# Patient Record
Sex: FEMALE | Race: WHITE | NOT HISPANIC OR LATINO
[De-identification: names, ages, dates, MRNs, and addresses within clinical notes are randomized per-mention and may not be internally consistent; named-entity substitution may affect disease eponyms.]

---

## 2017-01-11 ENCOUNTER — APPOINTMENT (OUTPATIENT)
Dept: MAMMOGRAPHY | Facility: CLINIC | Age: 82
End: 2017-01-11

## 2017-01-11 ENCOUNTER — OUTPATIENT (OUTPATIENT)
Dept: OUTPATIENT SERVICES | Facility: HOSPITAL | Age: 82
LOS: 1 days | End: 2017-01-11
Payer: MEDICARE

## 2017-01-11 ENCOUNTER — APPOINTMENT (OUTPATIENT)
Dept: ULTRASOUND IMAGING | Facility: CLINIC | Age: 82
End: 2017-01-11

## 2017-01-11 DIAGNOSIS — Z00.00 ENCOUNTER FOR GENERAL ADULT MEDICAL EXAMINATION WITHOUT ABNORMAL FINDINGS: ICD-10-CM

## 2017-01-11 DIAGNOSIS — D17.9 BENIGN LIPOMATOUS NEOPLASM, UNSPECIFIED: ICD-10-CM

## 2017-01-11 PROCEDURE — 77067 SCR MAMMO BI INCL CAD: CPT

## 2017-01-11 PROCEDURE — 76536 US EXAM OF HEAD AND NECK: CPT

## 2017-01-11 PROCEDURE — 77063 BREAST TOMOSYNTHESIS BI: CPT

## 2017-01-13 ENCOUNTER — MESSAGE (OUTPATIENT)
Age: 82
End: 2017-01-13

## 2017-01-17 DIAGNOSIS — Z12.31 ENCOUNTER FOR SCREENING MAMMOGRAM FOR MALIGNANT NEOPLASM OF BREAST: ICD-10-CM

## 2017-01-17 DIAGNOSIS — R22.0 LOCALIZED SWELLING, MASS AND LUMP, HEAD: ICD-10-CM

## 2017-01-20 ENCOUNTER — RX RENEWAL (OUTPATIENT)
Age: 82
End: 2017-01-20

## 2017-01-22 ENCOUNTER — EMERGENCY (EMERGENCY)
Facility: HOSPITAL | Age: 82
LOS: 1 days | Discharge: HOME | End: 2017-01-22
Attending: EMERGENCY MEDICINE
Payer: MEDICARE

## 2017-01-22 VITALS — WEIGHT: 128.09 LBS

## 2017-01-22 VITALS — SYSTOLIC BLOOD PRESSURE: 186 MMHG | HEART RATE: 80 BPM | DIASTOLIC BLOOD PRESSURE: 86 MMHG

## 2017-01-22 LAB
ALBUMIN SERPL ELPH-MCNC: 4.4 G/DL — SIGNIFICANT CHANGE UP (ref 3.3–5.2)
ALP SERPL-CCNC: 81 U/L — SIGNIFICANT CHANGE UP (ref 40–120)
ALT FLD-CCNC: 14 U/L — SIGNIFICANT CHANGE UP
ANION GAP SERPL CALC-SCNC: 16 MMOL/L — SIGNIFICANT CHANGE UP (ref 5–17)
APPEARANCE UR: CLEAR — SIGNIFICANT CHANGE UP
AST SERPL-CCNC: 19 U/L — SIGNIFICANT CHANGE UP
BACTERIA # UR AUTO: ABNORMAL
BASOPHILS # BLD AUTO: 0 K/UL — SIGNIFICANT CHANGE UP (ref 0–0.2)
BASOPHILS NFR BLD AUTO: 0.5 % — SIGNIFICANT CHANGE UP (ref 0–2)
BILIRUB SERPL-MCNC: 1.1 MG/DL — SIGNIFICANT CHANGE UP (ref 0.4–2)
BILIRUB UR-MCNC: NEGATIVE — SIGNIFICANT CHANGE UP
BUN SERPL-MCNC: 19 MG/DL — SIGNIFICANT CHANGE UP (ref 8–20)
CALCIUM SERPL-MCNC: 10.2 MG/DL — SIGNIFICANT CHANGE UP (ref 8.6–10.2)
CHLORIDE SERPL-SCNC: 97 MMOL/L — LOW (ref 98–107)
CO2 SERPL-SCNC: 27 MMOL/L — SIGNIFICANT CHANGE UP (ref 22–29)
COLOR SPEC: YELLOW — SIGNIFICANT CHANGE UP
CREAT SERPL-MCNC: 0.61 MG/DL — SIGNIFICANT CHANGE UP (ref 0.5–1.3)
DIFF PNL FLD: ABNORMAL
EOSINOPHIL # BLD AUTO: 0.2 K/UL — SIGNIFICANT CHANGE UP (ref 0–0.5)
EOSINOPHIL NFR BLD AUTO: 2.7 % — SIGNIFICANT CHANGE UP (ref 0–6)
EPI CELLS # UR: SIGNIFICANT CHANGE UP
GLUCOSE SERPL-MCNC: 119 MG/DL — HIGH (ref 70–115)
GLUCOSE UR QL: NEGATIVE MG/DL — SIGNIFICANT CHANGE UP
HCT VFR BLD CALC: 41.1 % — SIGNIFICANT CHANGE UP (ref 37–47)
HGB BLD-MCNC: 13.6 G/DL — SIGNIFICANT CHANGE UP (ref 12–16)
KETONES UR-MCNC: NEGATIVE — SIGNIFICANT CHANGE UP
LEUKOCYTE ESTERASE UR-ACNC: ABNORMAL
LYMPHOCYTES # BLD AUTO: 2 K/UL — SIGNIFICANT CHANGE UP (ref 1–4.8)
LYMPHOCYTES # BLD AUTO: 31.9 % — SIGNIFICANT CHANGE UP (ref 20–55)
MCHC RBC-ENTMCNC: 32.3 PG — HIGH (ref 27–31)
MCHC RBC-ENTMCNC: 33.1 G/DL — SIGNIFICANT CHANGE UP (ref 32–36)
MCV RBC AUTO: 97.6 FL — SIGNIFICANT CHANGE UP (ref 81–99)
MONOCYTES # BLD AUTO: 0.7 K/UL — SIGNIFICANT CHANGE UP (ref 0–0.8)
MONOCYTES NFR BLD AUTO: 11 % — HIGH (ref 3–10)
NEUTROPHILS # BLD AUTO: 3.3 K/UL — SIGNIFICANT CHANGE UP (ref 1.8–8)
NEUTROPHILS NFR BLD AUTO: 53.4 % — SIGNIFICANT CHANGE UP (ref 37–73)
NITRITE UR-MCNC: NEGATIVE — SIGNIFICANT CHANGE UP
PH UR: 7 — SIGNIFICANT CHANGE UP (ref 4.8–8)
PLATELET # BLD AUTO: 207 K/UL — SIGNIFICANT CHANGE UP (ref 150–400)
POTASSIUM SERPL-MCNC: 3.6 MMOL/L — SIGNIFICANT CHANGE UP (ref 3.5–5.3)
POTASSIUM SERPL-SCNC: 3.6 MMOL/L — SIGNIFICANT CHANGE UP (ref 3.5–5.3)
PROT SERPL-MCNC: 7.5 G/DL — SIGNIFICANT CHANGE UP (ref 6.6–8.7)
PROT UR-MCNC: NEGATIVE MG/DL — SIGNIFICANT CHANGE UP
RBC # BLD: 4.21 M/UL — LOW (ref 4.4–5.2)
RBC # FLD: 13.2 % — SIGNIFICANT CHANGE UP (ref 11–15.6)
RBC CASTS # UR COMP ASSIST: ABNORMAL /HPF (ref 0–4)
SODIUM SERPL-SCNC: 140 MMOL/L — SIGNIFICANT CHANGE UP (ref 135–145)
SP GR SPEC: 1.01 — SIGNIFICANT CHANGE UP (ref 1.01–1.02)
UROBILINOGEN FLD QL: NEGATIVE MG/DL — SIGNIFICANT CHANGE UP
WBC # BLD: 6.2 K/UL — SIGNIFICANT CHANGE UP (ref 4.8–10.8)
WBC # FLD AUTO: 6.2 K/UL — SIGNIFICANT CHANGE UP (ref 4.8–10.8)
WBC UR QL: ABNORMAL

## 2017-01-22 PROCEDURE — 74177 CT ABD & PELVIS W/CONTRAST: CPT

## 2017-01-22 PROCEDURE — 74177 CT ABD & PELVIS W/CONTRAST: CPT | Mod: 26

## 2017-01-22 PROCEDURE — 76856 US EXAM PELVIC COMPLETE: CPT | Mod: 26

## 2017-01-22 PROCEDURE — 87086 URINE CULTURE/COLONY COUNT: CPT

## 2017-01-22 PROCEDURE — 76856 US EXAM PELVIC COMPLETE: CPT

## 2017-01-22 PROCEDURE — 81001 URINALYSIS AUTO W/SCOPE: CPT

## 2017-01-22 PROCEDURE — 85027 COMPLETE CBC AUTOMATED: CPT

## 2017-01-22 PROCEDURE — 80053 COMPREHEN METABOLIC PANEL: CPT

## 2017-01-22 PROCEDURE — 99284 EMERGENCY DEPT VISIT MOD MDM: CPT

## 2017-01-22 PROCEDURE — 99284 EMERGENCY DEPT VISIT MOD MDM: CPT | Mod: 25

## 2017-01-22 RX ORDER — METHENAMINE MANDELATE 1 G
1 TABLET ORAL
Qty: 20 | Refills: 0 | OUTPATIENT
Start: 2017-01-22 | End: 2017-02-01

## 2017-01-22 RX ORDER — SODIUM CHLORIDE 9 MG/ML
1000 INJECTION INTRAMUSCULAR; INTRAVENOUS; SUBCUTANEOUS ONCE
Qty: 0 | Refills: 0 | Status: COMPLETED | OUTPATIENT
Start: 2017-01-22 | End: 2017-01-22

## 2017-01-22 RX ADMIN — SODIUM CHLORIDE 1000 MILLILITER(S): 9 INJECTION INTRAMUSCULAR; INTRAVENOUS; SUBCUTANEOUS at 13:53

## 2017-01-22 NOTE — ED ADULT NURSE REASSESSMENT NOTE - NS ED NURSE REASSESS COMMENT FT1
PT axox3  with new onset of shaking after drinking PO contrast. Pt denies any allergies and states she felt a fluttering in her stomach, pt does have slight tremors and is anxious. NIKKO Ospina made aware, v/s done ( please refer to flow sheet). Tremors now subsiding,  pt thinks it may have been because she did not eat anything today with a combination of nerves.

## 2017-01-22 NOTE — ED STATDOCS - MEDICAL DECISION MAKING DETAILS
82 y/o F pt w/ vaginal bleeding. Will get pelvic exam, US and possible further testing to evaluate for vaginal bleeding.

## 2017-01-22 NOTE — ED STATDOCS - NS ED MD SCRIBE ATTENDING SCRIBE SECTIONS
PHYSICAL EXAM/VITAL SIGNS( Pullset)/REVIEW OF SYSTEMS/DISPOSITION/PAST MEDICAL/SURGICAL/SOCIAL HISTORY/HISTORY OF PRESENT ILLNESS

## 2017-01-22 NOTE — ED STATDOCS - OBJECTIVE STATEMENT
82 y/o F pt w/ PMHx of HTN and HLD presents to the ED c/o vaginal bleeding (passing small clots) onset today morning. Pt also notes headache today. Pt states that she was taking a shower when had continuous vaginal bleeding; pt had episode of vaginal bleeding a few years ago and noted a "blockage" of the vaginal that was not painful. Pt has not had a pap smear in more than 5 years. Pt takes a baby ASA today morning. Pt denies abd pain, fever, chills, other blood thinner use, N/V/D or any other complaints. Pt has multiple drug allergies as stated in records. PMD: Dr. White. 82 y/o F pt w/ PMHx of HTN and HLD presents to the ED c/o vaginal bleeding (passing small clots) onset today morning. Pt also notes headache today. Pt states that she was taking a shower when she had continuous vaginal bleeding; pt had episode of vaginal bleeding a few years ago and noted a "blockage" of the vagina that was not painful. Pt has not had a pap smear in more than 5 years. Pt takes a baby ASA daily. No blood thinners.Pt denies abd pain, fever, chills, other blood thinner use, N/V/D or any other complaints. Pt has multiple drug allergies as stated in records. PMD: Dr. White.

## 2017-01-22 NOTE — ED ADULT NURSE NOTE - OBJECTIVE STATEMENT
Pt axox3 c/o vaginal bleeding which started today in the shower. Pt states  she does not have a hx of vag. bleeding and was also expelling clots with a moderate flow of blood. Now patients states it has stopped but does feel a little light  headed. Pt ambulatory in ER, daughter at bedside. Pt denies any recent bleeding, cramping or pain.

## 2017-01-22 NOTE — ED STATDOCS - PROGRESS NOTE DETAILS
Pt seen and evaluated. 80 yo F pmh HTN, HLD on baby ASA  presented to ED with c/o vaginal bleeding today cleaning shower. Pt reports some dizziness. No CP, SOB or palp. Pt last saw GYN approx 5 years ago. Pt denies any easy bruising or bleeding. No pain. Pelvc exam + prolapse with varicosities. no active bleeding. Will check labs, US and CT CT noted- incidental kidney stone 3mm- pt denies pain. UA + blood. Urine culture sent. PE varicosities in prolapse - ?vaginal bleed vs hematuria. Instructed to f/u GYN and .

## 2017-01-22 NOTE — ED STATDOCS - CARE PLAN
Principal Discharge DX:	Vaginal bleeding  Secondary Diagnosis:	Kidney stone Principal Discharge DX:	Vaginal bleeding  Secondary Diagnosis:	Kidney stone  Secondary Diagnosis:	UTI (urinary tract infection)

## 2017-01-23 LAB
CULTURE RESULTS: SIGNIFICANT CHANGE UP
SPECIMEN SOURCE: SIGNIFICANT CHANGE UP

## 2017-01-23 NOTE — ED POST DISCHARGE NOTE - OTHER COMMUNICATION
Spoke with daughter, pt is doing well with no current complaints, has pending f.u with pcp this week, will repeat UA then.

## 2017-01-26 DIAGNOSIS — N39.0 URINARY TRACT INFECTION, SITE NOT SPECIFIED: ICD-10-CM

## 2017-01-26 DIAGNOSIS — Z88.8 ALLERGY STATUS TO OTHER DRUGS, MEDICAMENTS AND BIOLOGICAL SUBSTANCES STATUS: ICD-10-CM

## 2017-01-26 DIAGNOSIS — I10 ESSENTIAL (PRIMARY) HYPERTENSION: ICD-10-CM

## 2017-01-26 DIAGNOSIS — Z88.0 ALLERGY STATUS TO PENICILLIN: ICD-10-CM

## 2017-01-26 DIAGNOSIS — Z88.1 ALLERGY STATUS TO OTHER ANTIBIOTIC AGENTS STATUS: ICD-10-CM

## 2017-01-26 DIAGNOSIS — N39.9 DISORDER OF URINARY SYSTEM, UNSPECIFIED: ICD-10-CM

## 2017-01-26 DIAGNOSIS — N20.0 CALCULUS OF KIDNEY: ICD-10-CM

## 2017-01-26 DIAGNOSIS — E78.5 HYPERLIPIDEMIA, UNSPECIFIED: ICD-10-CM

## 2017-01-26 DIAGNOSIS — R51 HEADACHE: ICD-10-CM

## 2017-03-22 ENCOUNTER — RX RENEWAL (OUTPATIENT)
Age: 82
End: 2017-03-22

## 2017-06-12 ENCOUNTER — APPOINTMENT (OUTPATIENT)
Dept: INTERNAL MEDICINE | Facility: CLINIC | Age: 82
End: 2017-06-12

## 2017-06-12 ENCOUNTER — RX RENEWAL (OUTPATIENT)
Age: 82
End: 2017-06-12

## 2017-06-12 VITALS
SYSTOLIC BLOOD PRESSURE: 120 MMHG | HEIGHT: 61 IN | BODY MASS INDEX: 24.55 KG/M2 | WEIGHT: 130 LBS | DIASTOLIC BLOOD PRESSURE: 66 MMHG

## 2017-06-12 PROBLEM — I10 ESSENTIAL (PRIMARY) HYPERTENSION: Chronic | Status: ACTIVE | Noted: 2017-01-22

## 2017-06-12 PROBLEM — E78.5 HYPERLIPIDEMIA, UNSPECIFIED: Chronic | Status: ACTIVE | Noted: 2017-01-22

## 2017-06-14 LAB
25(OH)D3 SERPL-MCNC: 46.5 NG/ML
ANION GAP SERPL CALC-SCNC: 16 MMOL/L
BUN SERPL-MCNC: 22 MG/DL
CALCIUM SERPL-MCNC: 9.6 MG/DL
CHLORIDE SERPL-SCNC: 99 MMOL/L
CHOLEST SERPL-MCNC: 207 MG/DL
CHOLEST/HDLC SERPL: 3.1 RATIO
CO2 SERPL-SCNC: 29 MMOL/L
CREAT SERPL-MCNC: 0.82 MG/DL
GLUCOSE SERPL-MCNC: 118 MG/DL
HBA1C MFR BLD HPLC: 5.9 %
HDLC SERPL-MCNC: 66 MG/DL
LDLC SERPL CALC-MCNC: 119 MG/DL
POTASSIUM SERPL-SCNC: 4.5 MMOL/L
SODIUM SERPL-SCNC: 144 MMOL/L
TRIGL SERPL-MCNC: 108 MG/DL

## 2017-06-16 ENCOUNTER — RESULT REVIEW (OUTPATIENT)
Age: 82
End: 2017-06-16

## 2017-06-29 ENCOUNTER — RX RENEWAL (OUTPATIENT)
Age: 82
End: 2017-06-29

## 2017-09-22 ENCOUNTER — RX RENEWAL (OUTPATIENT)
Age: 82
End: 2017-09-22

## 2017-10-26 ENCOUNTER — RX RENEWAL (OUTPATIENT)
Age: 82
End: 2017-10-26

## 2017-12-04 ENCOUNTER — APPOINTMENT (OUTPATIENT)
Dept: RADIOLOGY | Facility: CLINIC | Age: 82
End: 2017-12-04

## 2017-12-04 ENCOUNTER — APPOINTMENT (OUTPATIENT)
Dept: INTERNAL MEDICINE | Facility: CLINIC | Age: 82
End: 2017-12-04
Payer: MEDICARE

## 2017-12-04 ENCOUNTER — OUTPATIENT (OUTPATIENT)
Dept: OUTPATIENT SERVICES | Facility: HOSPITAL | Age: 82
LOS: 1 days | End: 2017-12-04
Payer: MEDICARE

## 2017-12-04 VITALS
OXYGEN SATURATION: 97 % | DIASTOLIC BLOOD PRESSURE: 68 MMHG | HEIGHT: 61 IN | WEIGHT: 130 LBS | HEART RATE: 82 BPM | SYSTOLIC BLOOD PRESSURE: 130 MMHG | BODY MASS INDEX: 24.55 KG/M2

## 2017-12-04 DIAGNOSIS — M25.562 PAIN IN LEFT KNEE: ICD-10-CM

## 2017-12-04 PROCEDURE — 36415 COLL VENOUS BLD VENIPUNCTURE: CPT

## 2017-12-04 PROCEDURE — 73502 X-RAY EXAM HIP UNI 2-3 VIEWS: CPT | Mod: 26,LT

## 2017-12-04 PROCEDURE — 73562 X-RAY EXAM OF KNEE 3: CPT

## 2017-12-04 PROCEDURE — 73562 X-RAY EXAM OF KNEE 3: CPT | Mod: 26,LT

## 2017-12-04 PROCEDURE — 99214 OFFICE O/P EST MOD 30 MIN: CPT | Mod: 25

## 2017-12-04 PROCEDURE — 73502 X-RAY EXAM HIP UNI 2-3 VIEWS: CPT

## 2017-12-06 LAB
ANION GAP SERPL CALC-SCNC: 17 MMOL/L
BUN SERPL-MCNC: 28 MG/DL
CALCIUM SERPL-MCNC: 10.6 MG/DL
CHLORIDE SERPL-SCNC: 102 MMOL/L
CHOLEST SERPL-MCNC: 247 MG/DL
CHOLEST/HDLC SERPL: 3.5 RATIO
CO2 SERPL-SCNC: 27 MMOL/L
CREAT SERPL-MCNC: 0.98 MG/DL
GLUCOSE SERPL-MCNC: 124 MG/DL
HBA1C MFR BLD HPLC: 5.9 %
HDLC SERPL-MCNC: 71 MG/DL
LDLC SERPL CALC-MCNC: 150 MG/DL
POTASSIUM SERPL-SCNC: 4.1 MMOL/L
SODIUM SERPL-SCNC: 146 MMOL/L
TRIGL SERPL-MCNC: 132 MG/DL

## 2017-12-18 ENCOUNTER — MEDICATION RENEWAL (OUTPATIENT)
Age: 82
End: 2017-12-18

## 2018-02-07 ENCOUNTER — OUTPATIENT (OUTPATIENT)
Dept: OUTPATIENT SERVICES | Facility: HOSPITAL | Age: 83
LOS: 1 days | End: 2018-02-07
Payer: MEDICARE

## 2018-02-07 ENCOUNTER — APPOINTMENT (OUTPATIENT)
Dept: MAMMOGRAPHY | Facility: CLINIC | Age: 83
End: 2018-02-07

## 2018-02-07 DIAGNOSIS — Z00.00 ENCOUNTER FOR GENERAL ADULT MEDICAL EXAMINATION WITHOUT ABNORMAL FINDINGS: ICD-10-CM

## 2018-02-07 PROCEDURE — 77063 BREAST TOMOSYNTHESIS BI: CPT | Mod: 26

## 2018-02-07 PROCEDURE — 77067 SCR MAMMO BI INCL CAD: CPT

## 2018-02-07 PROCEDURE — 77063 BREAST TOMOSYNTHESIS BI: CPT

## 2018-02-07 PROCEDURE — 77067 SCR MAMMO BI INCL CAD: CPT | Mod: 26

## 2018-02-28 ENCOUNTER — APPOINTMENT (OUTPATIENT)
Dept: VASCULAR SURGERY | Facility: CLINIC | Age: 83
End: 2018-02-28
Payer: MEDICARE

## 2018-02-28 VITALS
DIASTOLIC BLOOD PRESSURE: 79 MMHG | OXYGEN SATURATION: 95 % | SYSTOLIC BLOOD PRESSURE: 179 MMHG | TEMPERATURE: 98.5 F | HEART RATE: 80 BPM | RESPIRATION RATE: 16 BRPM | WEIGHT: 132 LBS | HEIGHT: 61 IN | BODY MASS INDEX: 24.92 KG/M2

## 2018-02-28 DIAGNOSIS — Z84.89 FAMILY HISTORY OF OTHER SPECIFIED CONDITIONS: ICD-10-CM

## 2018-02-28 DIAGNOSIS — Z82.49 FAMILY HISTORY OF ISCHEMIC HEART DISEASE AND OTHER DISEASES OF THE CIRCULATORY SYSTEM: ICD-10-CM

## 2018-02-28 PROCEDURE — 93970 EXTREMITY STUDY: CPT

## 2018-02-28 PROCEDURE — 99203 OFFICE O/P NEW LOW 30 MIN: CPT

## 2018-03-19 ENCOUNTER — MEDICATION RENEWAL (OUTPATIENT)
Age: 83
End: 2018-03-19

## 2018-03-28 PROBLEM — Z82.49 FAMILY HISTORY OF VARICOSE VEINS: Status: ACTIVE | Noted: 2018-02-28

## 2018-03-28 PROBLEM — Z84.89 FAMILY HISTORY OF PHLEBITIS: Status: ACTIVE | Noted: 2018-02-28

## 2018-03-29 ENCOUNTER — MEDICATION RENEWAL (OUTPATIENT)
Age: 83
End: 2018-03-29

## 2018-04-23 ENCOUNTER — RX RENEWAL (OUTPATIENT)
Age: 83
End: 2018-04-23

## 2018-06-04 ENCOUNTER — NON-APPOINTMENT (OUTPATIENT)
Age: 83
End: 2018-06-04

## 2018-06-04 ENCOUNTER — APPOINTMENT (OUTPATIENT)
Dept: INTERNAL MEDICINE | Facility: CLINIC | Age: 83
End: 2018-06-04
Payer: MEDICARE

## 2018-06-04 VITALS
OXYGEN SATURATION: 96 % | SYSTOLIC BLOOD PRESSURE: 138 MMHG | DIASTOLIC BLOOD PRESSURE: 68 MMHG | WEIGHT: 132 LBS | HEART RATE: 74 BPM | HEIGHT: 61 IN | BODY MASS INDEX: 24.92 KG/M2

## 2018-06-04 DIAGNOSIS — I83.93 ASYMPTOMATIC VARICOSE VEINS OF BILATERAL LOWER EXTREMITIES: ICD-10-CM

## 2018-06-04 PROCEDURE — G0403: CPT

## 2018-06-04 PROCEDURE — G0402 INITIAL PREVENTIVE EXAM: CPT | Mod: 25

## 2018-06-04 PROCEDURE — 36415 COLL VENOUS BLD VENIPUNCTURE: CPT

## 2018-06-04 NOTE — ASSESSMENT
[FreeTextEntry1] : Labs will be sent out and further recommendations will be made based upon results. \par Blood pressure well controlled on current regimen. \par Last mammo 2/18.\par She is up to date on vaccines.\par She will follow up in 6 months.

## 2018-06-04 NOTE — HISTORY OF PRESENT ILLNESS
[de-identified] : Patient presents for CPE. History is significant for hypertension, hyperlipidemia, osteopenia, prediabetes. She is on amlodipine, benazepril and HCTZ for hypertension, pravastatin for hyperlipidemia and vitamin D for osteopenia. She is controlling blood sugar dietarily. She feels well and ha no acute complaints. She recently saw vascular surgery after home visit from insurance company showed severe PAD on ALEX/PVR, which was not seen when repeated. She had had phlebitis that resolved, which was likely the cause of PAD on initial testing.

## 2018-06-04 NOTE — PHYSICAL EXAM

## 2018-06-04 NOTE — HEALTH RISK ASSESSMENT
[] : No [0] : 2) Feeling down, depressed, or hopeless: Not at all (0) [[id number]] : 0 [With Significant Other] : lives with significant other [] :  [Fully functional (bathing, dressing, toileting, transferring, walking, feeding)] : Fully functional (bathing, dressing, toileting, transferring, walking, feeding) [Fully functional (using the telephone, shopping, preparing meals, housekeeping, doing laundry, using] : Fully functional and needs no help or supervision to perform IADLs (using the telephone, shopping, preparing meals, housekeeping, doing laundry, using transportation, managing medications and managing finances) [Discussed at today's visit] : Advance Directives Discussed at today's visit [Designated Healthcare Proxy] : Designated healthcare proxy [Name: ___] : Health Care Proxy's Name: [unfilled]  [Relationship: ___] : Relationship: [unfilled]

## 2018-06-06 LAB
ALBUMIN SERPL ELPH-MCNC: 4.5 G/DL
ALP BLD-CCNC: 86 U/L
ALT SERPL-CCNC: 16 U/L
ANION GAP SERPL CALC-SCNC: 13 MMOL/L
AST SERPL-CCNC: 21 U/L
BASOPHILS # BLD AUTO: 0.03 K/UL
BASOPHILS NFR BLD AUTO: 0.6 %
BILIRUB SERPL-MCNC: 1.1 MG/DL
BUN SERPL-MCNC: 21 MG/DL
CALCIUM SERPL-MCNC: 10 MG/DL
CHLORIDE SERPL-SCNC: 98 MMOL/L
CHOLEST SERPL-MCNC: 243 MG/DL
CHOLEST/HDLC SERPL: 3.1 RATIO
CO2 SERPL-SCNC: 29 MMOL/L
CREAT SERPL-MCNC: 0.89 MG/DL
EOSINOPHIL # BLD AUTO: 0.12 K/UL
EOSINOPHIL NFR BLD AUTO: 2.3 %
GLUCOSE SERPL-MCNC: 124 MG/DL
HBA1C MFR BLD HPLC: 5.9 %
HCT VFR BLD CALC: 42.7 %
HDLC SERPL-MCNC: 79 MG/DL
HGB BLD-MCNC: 13.4 G/DL
IMM GRANULOCYTES NFR BLD AUTO: 0.4 %
LDLC SERPL CALC-MCNC: 138 MG/DL
LYMPHOCYTES # BLD AUTO: 1.59 K/UL
LYMPHOCYTES NFR BLD AUTO: 30 %
MAN DIFF?: NORMAL
MCHC RBC-ENTMCNC: 31.1 PG
MCHC RBC-ENTMCNC: 31.4 GM/DL
MCV RBC AUTO: 99.1 FL
MONOCYTES # BLD AUTO: 0.43 K/UL
MONOCYTES NFR BLD AUTO: 8.1 %
NEUTROPHILS # BLD AUTO: 3.11 K/UL
NEUTROPHILS NFR BLD AUTO: 58.6 %
PLATELET # BLD AUTO: 200 K/UL
POTASSIUM SERPL-SCNC: 4 MMOL/L
PROT SERPL-MCNC: 7 G/DL
RBC # BLD: 4.31 M/UL
RBC # FLD: 13.5 %
SODIUM SERPL-SCNC: 140 MMOL/L
TRIGL SERPL-MCNC: 130 MG/DL
TSH SERPL-ACNC: 2.89 UIU/ML
WBC # FLD AUTO: 5.3 K/UL

## 2018-09-11 ENCOUNTER — APPOINTMENT (OUTPATIENT)
Dept: ORTHOPEDIC SURGERY | Facility: CLINIC | Age: 83
End: 2018-09-11
Payer: MEDICARE

## 2018-09-11 VITALS
HEIGHT: 61 IN | WEIGHT: 132 LBS | DIASTOLIC BLOOD PRESSURE: 66 MMHG | SYSTOLIC BLOOD PRESSURE: 103 MMHG | HEART RATE: 94 BPM | BODY MASS INDEX: 24.92 KG/M2

## 2018-09-11 PROCEDURE — 72100 X-RAY EXAM L-S SPINE 2/3 VWS: CPT

## 2018-09-11 PROCEDURE — 99204 OFFICE O/P NEW MOD 45 MIN: CPT

## 2018-09-19 ENCOUNTER — APPOINTMENT (OUTPATIENT)
Dept: ORTHOPEDIC SURGERY | Facility: CLINIC | Age: 83
End: 2018-09-19

## 2018-10-05 ENCOUNTER — APPOINTMENT (OUTPATIENT)
Dept: ORTHOPEDIC SURGERY | Facility: CLINIC | Age: 83
End: 2018-10-05

## 2018-10-17 ENCOUNTER — APPOINTMENT (OUTPATIENT)
Dept: INTERNAL MEDICINE | Facility: CLINIC | Age: 83
End: 2018-10-17
Payer: MEDICARE

## 2018-10-17 VITALS
OXYGEN SATURATION: 94 % | SYSTOLIC BLOOD PRESSURE: 170 MMHG | HEIGHT: 61 IN | BODY MASS INDEX: 24.92 KG/M2 | DIASTOLIC BLOOD PRESSURE: 70 MMHG | HEART RATE: 87 BPM | WEIGHT: 132 LBS

## 2018-10-17 LAB
BILIRUB UR QL STRIP: NORMAL
CLARITY UR: CLEAR
COLLECTION METHOD: NORMAL
GLUCOSE UR-MCNC: 250
HCG UR QL: 8 EU/DL
HGB UR QL STRIP.AUTO: NORMAL
KETONES UR-MCNC: 15
LEUKOCYTE ESTERASE UR QL STRIP: NORMAL
NITRITE UR QL STRIP: POSITIVE
PH UR STRIP: 5
PROT UR STRIP-MCNC: 300
SP GR UR STRIP: 1.01

## 2018-10-17 PROCEDURE — 81003 URINALYSIS AUTO W/O SCOPE: CPT | Mod: QW

## 2018-10-17 PROCEDURE — 99214 OFFICE O/P EST MOD 30 MIN: CPT | Mod: 25

## 2018-10-17 NOTE — ASSESSMENT
[FreeTextEntry1] : Patient with UTI. Rx given for macrobid 100mg BID x 3 days. Will send out urine culture. Increase fluids. \par Repeat UA at next appointment. \par BP high, usually well controlled, likely due to discomfort.

## 2018-10-17 NOTE — REVIEW OF SYSTEMS
[Dysuria] : dysuria [Frequency] : frequency [Negative] : Respiratory [FreeTextEntry7] : suprapubic pressure [FreeTextEntry9] : spinal stenosis

## 2018-10-17 NOTE — PHYSICAL EXAM
[No Acute Distress] : no acute distress [Well-Appearing] : well-appearing [No Respiratory Distress] : no respiratory distress  [Clear to Auscultation] : lungs were clear to auscultation bilaterally [No Accessory Muscle Use] : no accessory muscle use [Normal Rate] : normal rate  [Regular Rhythm] : with a regular rhythm [Normal S1, S2] : normal S1 and S2 [No Murmur] : no murmur heard [Soft] : abdomen soft [Non Tender] : non-tender [Non-distended] : non-distended [Normal Bowel Sounds] : normal bowel sounds [No CVA Tenderness] : no CVA  tenderness [Normal Affect] : the affect was normal [Normal Insight/Judgement] : insight and judgment were intact

## 2018-10-19 LAB — BACTERIA UR CULT: NORMAL

## 2018-10-23 ENCOUNTER — APPOINTMENT (OUTPATIENT)
Dept: ORTHOPEDIC SURGERY | Facility: CLINIC | Age: 83
End: 2018-10-23
Payer: MEDICARE

## 2018-10-23 VITALS
SYSTOLIC BLOOD PRESSURE: 93 MMHG | HEIGHT: 61 IN | DIASTOLIC BLOOD PRESSURE: 53 MMHG | HEART RATE: 79 BPM | BODY MASS INDEX: 24.92 KG/M2 | WEIGHT: 132 LBS

## 2018-10-23 PROCEDURE — 99215 OFFICE O/P EST HI 40 MIN: CPT

## 2018-12-10 ENCOUNTER — RX RENEWAL (OUTPATIENT)
Age: 83
End: 2018-12-10

## 2018-12-16 ENCOUNTER — RX RENEWAL (OUTPATIENT)
Age: 83
End: 2018-12-16

## 2018-12-17 ENCOUNTER — APPOINTMENT (OUTPATIENT)
Dept: INTERNAL MEDICINE | Facility: CLINIC | Age: 83
End: 2018-12-17
Payer: MEDICARE

## 2018-12-17 VITALS
BODY MASS INDEX: 24.73 KG/M2 | OXYGEN SATURATION: 96 % | WEIGHT: 131 LBS | HEART RATE: 83 BPM | HEIGHT: 61 IN | SYSTOLIC BLOOD PRESSURE: 138 MMHG | DIASTOLIC BLOOD PRESSURE: 60 MMHG

## 2018-12-17 PROCEDURE — 36415 COLL VENOUS BLD VENIPUNCTURE: CPT

## 2018-12-17 PROCEDURE — 99214 OFFICE O/P EST MOD 30 MIN: CPT | Mod: 25

## 2018-12-17 RX ORDER — NITROFURANTOIN (MONOHYDRATE/MACROCRYSTALS) 25; 75 MG/1; MG/1
100 CAPSULE ORAL TWICE DAILY
Qty: 6 | Refills: 0 | Status: DISCONTINUED | COMMUNITY
Start: 2018-10-17 | End: 2018-12-17

## 2018-12-17 RX ORDER — SULFAMETHOXAZOLE AND TRIMETHOPRIM 800; 160 MG/1; MG/1
800-160 TABLET ORAL TWICE DAILY
Qty: 14 | Refills: 0 | Status: DISCONTINUED | COMMUNITY
Start: 2018-10-21 | End: 2018-12-17

## 2018-12-17 NOTE — HISTORY OF PRESENT ILLNESS
[de-identified] : Patient presents for follow up of hypertension, hyperlipidemia, osteopenia, prediabetes. She is on amlodipine, benazepril and HCTZ for hypertension, pravastatin for hyperlipidemia and vitamin D for osteopenia. She is controlling blood sugar dietarily. She complains of right ankle pain since yesterday, with swelling. Had been doing a lot of walking. Denies known injury. Going to PT for back pain.

## 2018-12-17 NOTE — ASSESSMENT
[FreeTextEntry1] : Further recommendations based on labs. \par Blood pressure well controlled on current regimen. \par Right ankle wrapped, additional support improved pain. Advised to wrap with ACE wrap, rest, elevated. Tylenol or naprosyn prn. \par Last mammo 2/18.\par She is up to date on vaccines.\par She will follow up in 6 months.

## 2018-12-17 NOTE — PHYSICAL EXAM
[No Acute Distress] : no acute distress [No Respiratory Distress] : no respiratory distress  [Clear to Auscultation] : lungs were clear to auscultation bilaterally [No Accessory Muscle Use] : no accessory muscle use [Normal Rate] : normal rate  [Regular Rhythm] : with a regular rhythm [Normal S1, S2] : normal S1 and S2 [No Murmur] : no murmur heard [No Edema] : there was no peripheral edema [Normal Affect] : the affect was normal [Normal Insight/Judgement] : insight and judgment were intact [de-identified] : right medial malleolus swollen, tender, not red or hot.

## 2018-12-18 LAB
25(OH)D3 SERPL-MCNC: 39.5 NG/ML
ALBUMIN SERPL ELPH-MCNC: 4.3 G/DL
ALP BLD-CCNC: 85 U/L
ALT SERPL-CCNC: 17 U/L
ANION GAP SERPL CALC-SCNC: 13 MMOL/L
AST SERPL-CCNC: 19 U/L
BILIRUB SERPL-MCNC: 0.9 MG/DL
BUN SERPL-MCNC: 21 MG/DL
CALCIUM SERPL-MCNC: 10.1 MG/DL
CHLORIDE SERPL-SCNC: 100 MMOL/L
CHOLEST SERPL-MCNC: 223 MG/DL
CHOLEST/HDLC SERPL: 3.2 RATIO
CO2 SERPL-SCNC: 30 MMOL/L
CREAT SERPL-MCNC: 0.93 MG/DL
GLUCOSE SERPL-MCNC: 118 MG/DL
HBA1C MFR BLD HPLC: 5.8 %
HDLC SERPL-MCNC: 70 MG/DL
LDLC SERPL CALC-MCNC: 129 MG/DL
POTASSIUM SERPL-SCNC: 4.4 MMOL/L
PROT SERPL-MCNC: 7 G/DL
SODIUM SERPL-SCNC: 143 MMOL/L
TRIGL SERPL-MCNC: 119 MG/DL

## 2019-02-07 ENCOUNTER — FORM ENCOUNTER (OUTPATIENT)
Age: 84
End: 2019-02-07

## 2019-02-08 ENCOUNTER — OUTPATIENT (OUTPATIENT)
Dept: OUTPATIENT SERVICES | Facility: HOSPITAL | Age: 84
LOS: 1 days | End: 2019-02-08
Payer: MEDICARE

## 2019-02-08 ENCOUNTER — APPOINTMENT (OUTPATIENT)
Dept: MAMMOGRAPHY | Facility: CLINIC | Age: 84
End: 2019-02-08
Payer: MEDICARE

## 2019-02-08 DIAGNOSIS — Z12.31 ENCOUNTER FOR SCREENING MAMMOGRAM FOR MALIGNANT NEOPLASM OF BREAST: ICD-10-CM

## 2019-02-08 PROCEDURE — 77063 BREAST TOMOSYNTHESIS BI: CPT | Mod: 26

## 2019-02-08 PROCEDURE — 77067 SCR MAMMO BI INCL CAD: CPT | Mod: 26

## 2019-02-08 PROCEDURE — 77067 SCR MAMMO BI INCL CAD: CPT

## 2019-02-08 PROCEDURE — 77063 BREAST TOMOSYNTHESIS BI: CPT

## 2019-02-12 ENCOUNTER — OTHER (OUTPATIENT)
Age: 84
End: 2019-02-12

## 2019-03-11 ENCOUNTER — RX RENEWAL (OUTPATIENT)
Age: 84
End: 2019-03-11

## 2019-04-16 ENCOUNTER — RX RENEWAL (OUTPATIENT)
Age: 84
End: 2019-04-16

## 2019-06-04 ENCOUNTER — APPOINTMENT (OUTPATIENT)
Dept: INTERNAL MEDICINE | Facility: CLINIC | Age: 84
End: 2019-06-04
Payer: MEDICARE

## 2019-06-04 VITALS
DIASTOLIC BLOOD PRESSURE: 66 MMHG | HEART RATE: 100 BPM | OXYGEN SATURATION: 97 % | HEIGHT: 61 IN | SYSTOLIC BLOOD PRESSURE: 124 MMHG | BODY MASS INDEX: 24.73 KG/M2 | RESPIRATION RATE: 15 BRPM | WEIGHT: 131 LBS

## 2019-06-04 LAB
BILIRUB UR QL STRIP: NORMAL
GLUCOSE UR-MCNC: NORMAL
HCG UR QL: 1 EU/DL
HGB UR QL STRIP.AUTO: NORMAL
KETONES UR-MCNC: NORMAL
LEUKOCYTE ESTERASE UR QL STRIP: NORMAL
NITRITE UR QL STRIP: POSITIVE
PH UR STRIP: 6
PROT UR STRIP-MCNC: NORMAL
SP GR UR STRIP: 1.03

## 2019-06-04 PROCEDURE — 99214 OFFICE O/P EST MOD 30 MIN: CPT | Mod: 25

## 2019-06-04 PROCEDURE — 81003 URINALYSIS AUTO W/O SCOPE: CPT | Mod: QW

## 2019-06-04 NOTE — ADDENDUM
[FreeTextEntry1] : I, Juliane Gomez, acted solely as a scribe for Dr. White on this date [6/4/2019].

## 2019-06-04 NOTE — END OF VISIT
[FreeTextEntry3] : All medical record entries made by the Scribe were at my, Dr. White's, direction and personally dictated by me on [6/4/2019]. I have reviewed the chart and agree that the record accurately reflects my personal performance of the history, physical exam, assessment and plan. I have also personally directed, reviewed and agreed with the chart.

## 2019-06-04 NOTE — PHYSICAL EXAM
[No Acute Distress] : no acute distress [Well Nourished] : well nourished [Well Developed] : well developed [No Respiratory Distress] : no respiratory distress  [Well-Appearing] : well-appearing [Clear to Auscultation] : lungs were clear to auscultation bilaterally [Normal Rate] : normal rate  [No Accessory Muscle Use] : no accessory muscle use [Regular Rhythm] : with a regular rhythm [No Murmur] : no murmur heard [Normal S1, S2] : normal S1 and S2 [Soft] : abdomen soft [Non-distended] : non-distended [No Masses] : no abdominal mass palpated [No HSM] : no HSM [Normal Affect] : the affect was normal [Alert and Oriented x3] : oriented to person, place, and time [Normal Insight/Judgement] : insight and judgment were intact [de-identified] : suprapubic tenderness with no guarding or rebounding

## 2019-06-04 NOTE — REVIEW OF SYSTEMS
[Chills] : chills [Abdominal Pain] : abdominal pain [Dysuria] : dysuria [Frequency] : frequency [Negative] : Heme/Lymph [FreeTextEntry7] : dry heaving

## 2019-06-04 NOTE — HISTORY OF PRESENT ILLNESS
[FreeTextEntry8] : Patient presents with dysuria for 6 days. She has associated urinary frequency, abdominal pain, chills and dry heaving. She is unsure if she had a fever. She has tried cranberry tea. She has not eaten in 4 days due to dry heaving, but she has been able to swallow pills.\par She has had UTIs in the past.

## 2019-06-04 NOTE — ASSESSMENT
[FreeTextEntry1] : UA supports urinary tract infection.\par Urine culture sent out. \par Rx given for Keflex 500mg BID for 7 days.\par Call if no improvement.\par Follow up in 2 weeks for CPE.

## 2019-06-07 LAB — BACTERIA UR CULT: ABNORMAL

## 2019-06-14 ENCOUNTER — RX RENEWAL (OUTPATIENT)
Age: 84
End: 2019-06-14

## 2019-06-17 ENCOUNTER — APPOINTMENT (OUTPATIENT)
Dept: INTERNAL MEDICINE | Facility: CLINIC | Age: 84
End: 2019-06-17
Payer: MEDICARE

## 2019-06-17 VITALS
WEIGHT: 133 LBS | OXYGEN SATURATION: 96 % | HEIGHT: 61 IN | SYSTOLIC BLOOD PRESSURE: 124 MMHG | BODY MASS INDEX: 25.11 KG/M2 | HEART RATE: 66 BPM | DIASTOLIC BLOOD PRESSURE: 64 MMHG | RESPIRATION RATE: 15 BRPM

## 2019-06-17 DIAGNOSIS — M54.2 CERVICALGIA: ICD-10-CM

## 2019-06-17 PROCEDURE — 36415 COLL VENOUS BLD VENIPUNCTURE: CPT

## 2019-06-17 PROCEDURE — G0444 DEPRESSION SCREEN ANNUAL: CPT | Mod: 59

## 2019-06-17 PROCEDURE — 99214 OFFICE O/P EST MOD 30 MIN: CPT | Mod: 25

## 2019-06-17 RX ORDER — CEPHALEXIN 500 MG/1
500 CAPSULE ORAL
Qty: 14 | Refills: 0 | Status: DISCONTINUED | COMMUNITY
Start: 2019-06-04 | End: 2019-06-17

## 2019-06-17 NOTE — END OF VISIT
[FreeTextEntry3] : All medical record entries made by the Scribe were at my, Dr. White's, direction and personally dictated by me on [6/17/2019]. I have reviewed the chart and agree that the record accurately reflects my personal performance of the history, physical exam, assessment and plan. I have also personally directed, reviewed and agreed with the chart.

## 2019-06-17 NOTE — PHYSICAL EXAM
[No Respiratory Distress] : no respiratory distress  [No Acute Distress] : no acute distress [Clear to Auscultation] : lungs were clear to auscultation bilaterally [No Accessory Muscle Use] : no accessory muscle use [Normal Rate] : normal rate  [Regular Rhythm] : with a regular rhythm [Normal S1, S2] : normal S1 and S2 [No Murmur] : no murmur heard [No Edema] : there was no peripheral edema [Normal Affect] : the affect was normal [Normal Insight/Judgement] : insight and judgment were intact [Well Nourished] : well nourished [Well Developed] : well developed [Well-Appearing] : well-appearing [Alert and Oriented x3] : oriented to person, place, and time

## 2019-06-17 NOTE — ADDENDUM
[FreeTextEntry1] : I, Juliane Gomez, acted solely as a scribe for Dr. White on this date [6/17/2019].

## 2019-06-17 NOTE — ASSESSMENT
[FreeTextEntry1] : Labs drawn in office. Further recommendations based on labs. \par Blood pressure well controlled on current regimen. \par Declined PT for neck pain, says she will have grandson work on it. \par Last mammo 2/18.\par She is up to date on vaccines.\par She will follow up in 6 months.

## 2019-06-17 NOTE — HISTORY OF PRESENT ILLNESS
[de-identified] : Patient presents for follow up of hypertension, hyperlipidemia, osteopenia, prediabetes. She is on amlodipine, benazepril and HCTZ for hypertension, pravastatin for hyperlipidemia and vitamin D for osteopenia. She is controlling blood sugar dietarily. She reports she has been eating better recently. She complains of worsening chronic neck pain, had grandson who is PT work on neck with improvement. She has tried taking Aleve, which has helped. . \par She was recently treated for a UTI and is now feeling better.

## 2019-06-26 ENCOUNTER — RESULT REVIEW (OUTPATIENT)
Age: 84
End: 2019-06-26

## 2019-06-26 LAB
25(OH)D3 SERPL-MCNC: 43 NG/ML
ALBUMIN SERPL ELPH-MCNC: 4 G/DL
ALP BLD-CCNC: 72 U/L
ALT SERPL-CCNC: 22 U/L
ANION GAP SERPL CALC-SCNC: 13 MMOL/L
AST SERPL-CCNC: 20 U/L
BASOPHILS # BLD AUTO: 0.05 K/UL
BASOPHILS NFR BLD AUTO: 1 %
BILIRUB SERPL-MCNC: 0.2 MG/DL
BUN SERPL-MCNC: 30 MG/DL
CALCIUM SERPL-MCNC: 10.4 MG/DL
CHLORIDE SERPL-SCNC: 100 MMOL/L
CHOLEST SERPL-MCNC: 213 MG/DL
CHOLEST/HDLC SERPL: 6.1 RATIO
CO2 SERPL-SCNC: 26 MMOL/L
CREAT SERPL-MCNC: 1.45 MG/DL
EOSINOPHIL # BLD AUTO: 0.18 K/UL
EOSINOPHIL NFR BLD AUTO: 3.7 %
ESTIMATED AVERAGE GLUCOSE: 134 MG/DL
GLUCOSE SERPL-MCNC: 97 MG/DL
HBA1C MFR BLD HPLC: 6.3 %
HCT VFR BLD CALC: 39.7 %
HDLC SERPL-MCNC: 35 MG/DL
HGB BLD-MCNC: 12 G/DL
IMM GRANULOCYTES NFR BLD AUTO: 1 %
LDLC SERPL CALC-MCNC: 141 MG/DL
LYMPHOCYTES # BLD AUTO: 0.91 K/UL
LYMPHOCYTES NFR BLD AUTO: 18.8 %
MAN DIFF?: NORMAL
MCHC RBC-ENTMCNC: 30.2 GM/DL
MCHC RBC-ENTMCNC: 31.3 PG
MCV RBC AUTO: 103.4 FL
MONOCYTES # BLD AUTO: 0.69 K/UL
MONOCYTES NFR BLD AUTO: 14.2 %
NEUTROPHILS # BLD AUTO: 2.97 K/UL
NEUTROPHILS NFR BLD AUTO: 61.3 %
PLATELET # BLD AUTO: 303 K/UL
POTASSIUM SERPL-SCNC: 5 MMOL/L
PROT SERPL-MCNC: 6.7 G/DL
RBC # BLD: 3.84 M/UL
RBC # FLD: 13 %
SODIUM SERPL-SCNC: 139 MMOL/L
TRIGL SERPL-MCNC: 184 MG/DL
TSH SERPL-ACNC: 4.67 UIU/ML
WBC # FLD AUTO: 4.85 K/UL

## 2019-07-05 NOTE — HISTORY OF PRESENT ILLNESS
[FreeTextEntry8] : Patient complains of urinary urgency, frequency, dysuria, suprapubic pressure for 4 days. No fever, chills, nausea, vomiting. Took cranberry, Azo with no improvement. Had recurrent UTIs several years ago, last one was 8 years ago. 
no loss of consciousness, no gait abnormality, no headache, no sensory deficits, and no weakness.

## 2019-07-28 ENCOUNTER — RX RENEWAL (OUTPATIENT)
Age: 84
End: 2019-07-28

## 2019-08-05 ENCOUNTER — APPOINTMENT (OUTPATIENT)
Dept: INTERNAL MEDICINE | Facility: CLINIC | Age: 84
End: 2019-08-05
Payer: MEDICARE

## 2019-08-05 VITALS
BODY MASS INDEX: 25.11 KG/M2 | DIASTOLIC BLOOD PRESSURE: 80 MMHG | HEIGHT: 61 IN | WEIGHT: 133 LBS | SYSTOLIC BLOOD PRESSURE: 150 MMHG

## 2019-08-05 PROCEDURE — 99213 OFFICE O/P EST LOW 20 MIN: CPT | Mod: 25

## 2019-08-05 PROCEDURE — 36415 COLL VENOUS BLD VENIPUNCTURE: CPT

## 2019-08-05 RX ORDER — SULFAMETHOXAZOLE AND TRIMETHOPRIM 800; 160 MG/1; MG/1
800-160 TABLET ORAL TWICE DAILY
Qty: 14 | Refills: 0 | Status: DISCONTINUED | COMMUNITY
Start: 2019-06-14 | End: 2019-08-05

## 2019-08-05 NOTE — HISTORY OF PRESENT ILLNESS
[de-identified] : Patient presents for follow up of hypertension, CKD. After last visit, noted to have increase in Cr. She has been taking 12.5mg of HCTZ since. Does not check her BP at home. She is also on amlodipine and benazepril for hypertension. She is on pravastatin for hyperlipidemia.  She is under a lot of stress today as her best friend just passed away. She has no acute complaints.

## 2019-08-05 NOTE — ASSESSMENT
[FreeTextEntry1] : BP slightly high but ok given age. Under a lot of stress today. Advised her to check at home. Goal <150/90. Check BMP, drawn in office. Follow up in 3 months.

## 2019-08-06 LAB
ANION GAP SERPL CALC-SCNC: 14 MMOL/L
BUN SERPL-MCNC: 17 MG/DL
CALCIUM SERPL-MCNC: 10.1 MG/DL
CHLORIDE SERPL-SCNC: 100 MMOL/L
CO2 SERPL-SCNC: 30 MMOL/L
CREAT SERPL-MCNC: 0.83 MG/DL
GLUCOSE SERPL-MCNC: 142 MG/DL
POTASSIUM SERPL-SCNC: 4.2 MMOL/L
SODIUM SERPL-SCNC: 143 MMOL/L

## 2019-08-07 ENCOUNTER — RESULT REVIEW (OUTPATIENT)
Age: 84
End: 2019-08-07

## 2019-09-06 ENCOUNTER — MEDICATION RENEWAL (OUTPATIENT)
Age: 84
End: 2019-09-06

## 2019-09-30 ENCOUNTER — RESULT REVIEW (OUTPATIENT)
Age: 84
End: 2019-09-30

## 2019-09-30 ENCOUNTER — RESULT CHARGE (OUTPATIENT)
Age: 84
End: 2019-09-30

## 2019-09-30 RX ORDER — SULFAMETHOXAZOLE AND TRIMETHOPRIM 800; 160 MG/1; MG/1
800-160 TABLET ORAL TWICE DAILY
Qty: 14 | Refills: 0 | Status: COMPLETED | COMMUNITY
Start: 2019-09-30 | End: 2019-10-07

## 2019-10-02 LAB
APPEARANCE: ABNORMAL
BACTERIA: NEGATIVE
BILIRUBIN URINE: ABNORMAL
BLOOD URINE: NEGATIVE
COLOR: ABNORMAL
GLUCOSE QUALITATIVE U: NEGATIVE
HYALINE CASTS: 9 /LPF
KETONES URINE: NORMAL
LEUKOCYTE ESTERASE URINE: ABNORMAL
MICROSCOPIC-UA: NORMAL
NITRITE URINE: NEGATIVE
PH URINE: 6
PROTEIN URINE: ABNORMAL
RED BLOOD CELLS URINE: 1 /HPF
SPECIFIC GRAVITY URINE: 1.03
SQUAMOUS EPITHELIAL CELLS: 1 /HPF
UROBILINOGEN URINE: NORMAL
WHITE BLOOD CELLS URINE: 129 /HPF

## 2019-10-03 LAB — BACTERIA UR CULT: NORMAL

## 2019-11-05 ENCOUNTER — RX RENEWAL (OUTPATIENT)
Age: 84
End: 2019-11-05

## 2019-11-18 ENCOUNTER — APPOINTMENT (OUTPATIENT)
Dept: INTERNAL MEDICINE | Facility: CLINIC | Age: 84
End: 2019-11-18

## 2019-12-16 ENCOUNTER — APPOINTMENT (OUTPATIENT)
Dept: INTERNAL MEDICINE | Facility: CLINIC | Age: 84
End: 2019-12-16

## 2020-01-21 ENCOUNTER — APPOINTMENT (OUTPATIENT)
Dept: INTERNAL MEDICINE | Facility: CLINIC | Age: 85
End: 2020-01-21
Payer: MEDICARE

## 2020-01-21 ENCOUNTER — NON-APPOINTMENT (OUTPATIENT)
Age: 85
End: 2020-01-21

## 2020-01-21 VITALS
WEIGHT: 127 LBS | BODY MASS INDEX: 23.98 KG/M2 | DIASTOLIC BLOOD PRESSURE: 60 MMHG | OXYGEN SATURATION: 96 % | HEIGHT: 61 IN | SYSTOLIC BLOOD PRESSURE: 136 MMHG | TEMPERATURE: 97.9 F | HEART RATE: 76 BPM

## 2020-01-21 DIAGNOSIS — H91.93 UNSPECIFIED HEARING LOSS, BILATERAL: ICD-10-CM

## 2020-01-21 DIAGNOSIS — Z87.440 PERSONAL HISTORY OF URINARY (TRACT) INFECTIONS: ICD-10-CM

## 2020-01-21 DIAGNOSIS — Z00.00 ENCOUNTER FOR GENERAL ADULT MEDICAL EXAMINATION W/OUT ABNORMAL FINDINGS: ICD-10-CM

## 2020-01-21 DIAGNOSIS — Z87.828 PERSONAL HISTORY OF OTHER (HEALED) PHYSICAL INJURY AND TRAUMA: ICD-10-CM

## 2020-01-21 PROCEDURE — G0439: CPT

## 2020-01-21 PROCEDURE — G0442 ANNUAL ALCOHOL SCREEN 15 MIN: CPT

## 2020-01-21 PROCEDURE — 36415 COLL VENOUS BLD VENIPUNCTURE: CPT

## 2020-01-21 NOTE — PHYSICAL EXAM
[No Acute Distress] : no acute distress [Well Nourished] : well nourished [Well Developed] : well developed [Well-Appearing] : well-appearing [PERRL] : pupils equal round and reactive to light [Normal Sclera/Conjunctiva] : normal sclera/conjunctiva [EOMI] : extraocular movements intact [Normal Outer Ear/Nose] : the outer ears and nose were normal in appearance [No JVD] : no jugular venous distention [Normal Oropharynx] : the oropharynx was normal [No Lymphadenopathy] : no lymphadenopathy [Supple] : supple [Thyroid Normal, No Nodules] : the thyroid was normal and there were no nodules present [No Respiratory Distress] : no respiratory distress  [No Accessory Muscle Use] : no accessory muscle use [Normal Rate] : normal rate  [Clear to Auscultation] : lungs were clear to auscultation bilaterally [Regular Rhythm] : with a regular rhythm [Normal S1, S2] : normal S1 and S2 [No Murmur] : no murmur heard [No Carotid Bruits] : no carotid bruits [No Abdominal Bruit] : a ~M bruit was not heard ~T in the abdomen [Pedal Pulses Present] : the pedal pulses are present [No Edema] : there was no peripheral edema [No Varicosities] : no varicosities [No Palpable Aorta] : no palpable aorta [No Extremity Clubbing/Cyanosis] : no extremity clubbing/cyanosis [Non Tender] : non-tender [Soft] : abdomen soft [Non-distended] : non-distended [No HSM] : no HSM [Normal Bowel Sounds] : normal bowel sounds [No Masses] : no abdominal mass palpated [Normal Posterior Cervical Nodes] : no posterior cervical lymphadenopathy [Normal Anterior Cervical Nodes] : no anterior cervical lymphadenopathy [No CVA Tenderness] : no CVA  tenderness [No Spinal Tenderness] : no spinal tenderness [No Joint Swelling] : no joint swelling [Grossly Normal Strength/Tone] : grossly normal strength/tone [No Rash] : no rash [No Focal Deficits] : no focal deficits [Normal Gait] : normal gait [Coordination Grossly Intact] : coordination grossly intact [Deep Tendon Reflexes (DTR)] : deep tendon reflexes were 2+ and symmetric [Normal Affect] : the affect was normal [Normal Insight/Judgement] : insight and judgment were intact

## 2020-01-21 NOTE — COUNSELING
[Adequate lighting] : Adequate lighting [Fall prevention counseling provided] : Fall prevention counseling provided [Use proper foot wear] : Use proper foot wear [Use recommended devices] : Use recommended devices [No throw rugs] : No throw rugs

## 2020-01-22 LAB
25(OH)D3 SERPL-MCNC: 52.9 NG/ML
ANION GAP SERPL CALC-SCNC: 16 MMOL/L
BASOPHILS # BLD AUTO: 0.04 K/UL
BASOPHILS NFR BLD AUTO: 0.7 %
BUN SERPL-MCNC: 22 MG/DL
CALCIUM SERPL-MCNC: 10.2 MG/DL
CHLORIDE SERPL-SCNC: 101 MMOL/L
CHOLEST SERPL-MCNC: 245 MG/DL
CHOLEST/HDLC SERPL: 3.3 RATIO
CO2 SERPL-SCNC: 27 MMOL/L
CREAT SERPL-MCNC: 0.8 MG/DL
EOSINOPHIL # BLD AUTO: 0.16 K/UL
EOSINOPHIL NFR BLD AUTO: 2.9 %
ESTIMATED AVERAGE GLUCOSE: 117 MG/DL
GLUCOSE SERPL-MCNC: 107 MG/DL
HBA1C MFR BLD HPLC: 5.7 %
HCT VFR BLD CALC: 43 %
HDLC SERPL-MCNC: 75 MG/DL
HGB BLD-MCNC: 13.4 G/DL
IMM GRANULOCYTES NFR BLD AUTO: 0.4 %
LDLC SERPL CALC-MCNC: 147 MG/DL
LYMPHOCYTES # BLD AUTO: 1.9 K/UL
LYMPHOCYTES NFR BLD AUTO: 34.9 %
MAN DIFF?: NORMAL
MCHC RBC-ENTMCNC: 30.9 PG
MCHC RBC-ENTMCNC: 31.2 GM/DL
MCV RBC AUTO: 99.3 FL
MONOCYTES # BLD AUTO: 0.55 K/UL
MONOCYTES NFR BLD AUTO: 10.1 %
NEUTROPHILS # BLD AUTO: 2.78 K/UL
NEUTROPHILS NFR BLD AUTO: 51 %
PLATELET # BLD AUTO: 196 K/UL
POTASSIUM SERPL-SCNC: 4.1 MMOL/L
RBC # BLD: 4.33 M/UL
RBC # FLD: 12.9 %
SODIUM SERPL-SCNC: 143 MMOL/L
TRIGL SERPL-MCNC: 114 MG/DL
WBC # FLD AUTO: 5.45 K/UL

## 2020-01-22 NOTE — HISTORY OF PRESENT ILLNESS
[FreeTextEntry1] : Annual wellness visit [de-identified] : -PMH: HTN, HLD, Lumbar & Cervical Spinal stenosis\par -SH: . 4 children. Lives with her  in an apartment (2nd floor). Denies any falls. Still driving. \par \par WILLIS is a 84 year F whom is here today for an annual well check and to establish care with a new PMD. \par Today, pt reports feeling well but does report changes in hearing over the past several months and is requesting referral to ENT\par She is accompanied by her  today.\par \par -DEXA: 1/2018\par -Mammogram: 2/2019\par \par -HTN: Currently on Amlodipine 5mg, Benzapril 40mg QD & HCTZ 12.5mg. Pt reports compliance with current meds. Does not follow with cardio. Reports being physically and socially active daily. Denies any SOB, CP or palpitations.

## 2020-01-22 NOTE — HEALTH RISK ASSESSMENT
[Very Good] : ~his/her~  mood as very good [Yes] : Yes [2 - 4 times a month (2 pts)] : 2-4 times a month (2 points) [1 or 2 (0 pts)] : 1 or 2 (0 points) [Never (0 pts)] : Never (0 points) [No] : In the past 12 months have you used drugs other than those required for medical reasons? No [No falls in past year] : Patient reported no falls in the past year [0] : 2) Feeling down, depressed, or hopeless: Not at all (0) [Patient reported mammogram was normal] : Patient reported mammogram was normal [Patient reported bone density results were normal] : Patient reported bone density results were normal [None] : None [With Family] : lives with family [Retired] : retired [] :  [# Of Children ___] : has [unfilled] children [Fully functional (bathing, dressing, toileting, transferring, walking, feeding)] : Fully functional (bathing, dressing, toileting, transferring, walking, feeding) [Fully functional (using the telephone, shopping, preparing meals, housekeeping, doing laundry, using] : Fully functional and needs no help or supervision to perform IADLs (using the telephone, shopping, preparing meals, housekeeping, doing laundry, using transportation, managing medications and managing finances) [Reports changes in hearing] : Reports changes in hearing [Reviewed no changes] : Reviewed no changes [Designated Healthcare Proxy] : Designated healthcare proxy [Name: ___] : Health Care Proxy's Name: [unfilled]  [Relationship: ___] : Relationship: [unfilled] [] : No [Audit-CScore] : 2 [[id number]] : 0 [Change in mental status noted] : No change in mental status noted [Reports changes in vision] : Reports no changes in vision [BoneDensityDate] : 01/18 [MammogramDate] : 02/19 [AdvancecareDate] : 01/20

## 2020-01-22 NOTE — ADDENDUM
[FreeTextEntry1] : CBC/BMP WNL\par Vitamin D WNL\par Lipid panel: Total chol 245, \par A1c: 5.7\par \par All looks good. No new recommendations at this time. Will repeat labs in 6 months

## 2020-01-22 NOTE — ASSESSMENT
[FreeTextEntry1] : EKG completed in office today demonstrates NSR with a nonspecific intraventricular conduction defect in lead V2. This EKG is unchanged dating back to 2016.\par \par -F/u labs drawn in office today\par -F/u w/ ENT\par -Followup mammogram\par -Stop use of aspirin given lack of history of CAD/CAD risk factors\par -DC HCTZ. Increase amlodipine from 5 mg to 10 mg\par -Home BP monitoring as discussed\par -RTO 3 months or sooner if needed (We'll readdress DEXA scan)

## 2020-01-23 ENCOUNTER — RESULT REVIEW (OUTPATIENT)
Age: 85
End: 2020-01-23

## 2020-02-10 ENCOUNTER — RX RENEWAL (OUTPATIENT)
Age: 85
End: 2020-02-10

## 2020-02-12 ENCOUNTER — APPOINTMENT (OUTPATIENT)
Dept: MAMMOGRAPHY | Facility: CLINIC | Age: 85
End: 2020-02-12
Payer: MEDICARE

## 2020-02-12 ENCOUNTER — OUTPATIENT (OUTPATIENT)
Dept: OUTPATIENT SERVICES | Facility: HOSPITAL | Age: 85
LOS: 1 days | End: 2020-02-12
Payer: COMMERCIAL

## 2020-02-12 DIAGNOSIS — Z12.31 ENCOUNTER FOR SCREENING MAMMOGRAM FOR MALIGNANT NEOPLASM OF BREAST: ICD-10-CM

## 2020-02-12 PROCEDURE — 77063 BREAST TOMOSYNTHESIS BI: CPT

## 2020-02-12 PROCEDURE — 77067 SCR MAMMO BI INCL CAD: CPT | Mod: 26

## 2020-02-12 PROCEDURE — 77067 SCR MAMMO BI INCL CAD: CPT

## 2020-02-12 PROCEDURE — 77063 BREAST TOMOSYNTHESIS BI: CPT | Mod: 26

## 2020-06-03 ENCOUNTER — APPOINTMENT (OUTPATIENT)
Dept: ORTHOPEDIC SURGERY | Facility: CLINIC | Age: 85
End: 2020-06-03
Payer: MEDICARE

## 2020-06-03 VITALS
HEART RATE: 73 BPM | HEIGHT: 60 IN | BODY MASS INDEX: 25.13 KG/M2 | DIASTOLIC BLOOD PRESSURE: 77 MMHG | SYSTOLIC BLOOD PRESSURE: 151 MMHG | WEIGHT: 128 LBS

## 2020-06-03 VITALS — TEMPERATURE: 98.6 F

## 2020-06-03 DIAGNOSIS — M65.351 TRIGGER FINGER, RIGHT LITTLE FINGER: ICD-10-CM

## 2020-06-03 DIAGNOSIS — M48.00 SPINAL STENOSIS, SITE UNSPECIFIED: ICD-10-CM

## 2020-06-03 PROCEDURE — 20550 NJX 1 TENDON SHEATH/LIGAMENT: CPT | Mod: F9

## 2020-06-03 PROCEDURE — 99214 OFFICE O/P EST MOD 30 MIN: CPT | Mod: 25

## 2020-06-19 ENCOUNTER — APPOINTMENT (OUTPATIENT)
Dept: INTERNAL MEDICINE | Facility: CLINIC | Age: 85
End: 2020-06-19
Payer: MEDICARE

## 2020-06-19 VITALS
DIASTOLIC BLOOD PRESSURE: 62 MMHG | WEIGHT: 128 LBS | HEART RATE: 75 BPM | BODY MASS INDEX: 25.13 KG/M2 | OXYGEN SATURATION: 96 % | RESPIRATION RATE: 14 BRPM | HEIGHT: 60 IN | SYSTOLIC BLOOD PRESSURE: 130 MMHG

## 2020-06-19 DIAGNOSIS — Z13.31 ENCOUNTER FOR SCREENING FOR DEPRESSION: ICD-10-CM

## 2020-06-19 DIAGNOSIS — Z86.39 PERSONAL HISTORY OF OTHER ENDOCRINE, NUTRITIONAL AND METABOLIC DISEASE: ICD-10-CM

## 2020-06-19 DIAGNOSIS — Z13.39 ENCOUNTER FOR SCREENING EXAM FOR OTHER MENTAL HEALTH AND BEHAVIORAL DISORDERS: ICD-10-CM

## 2020-06-19 PROCEDURE — 36415 COLL VENOUS BLD VENIPUNCTURE: CPT

## 2020-06-19 PROCEDURE — 99214 OFFICE O/P EST MOD 30 MIN: CPT | Mod: 25

## 2020-06-19 RX ORDER — LATANOPROST/PF 0.005 %
0.01 DROPS OPHTHALMIC (EYE)
Refills: 0 | Status: ACTIVE | COMMUNITY
Start: 2018-09-14

## 2020-06-22 DIAGNOSIS — D75.89 OTHER SPECIFIED DISEASES OF BLOOD AND BLOOD-FORMING ORGANS: ICD-10-CM

## 2020-06-22 LAB
25(OH)D3 SERPL-MCNC: 49 NG/ML
ANION GAP SERPL CALC-SCNC: 16 MMOL/L
BASOPHILS # BLD AUTO: 0.05 K/UL
BASOPHILS NFR BLD AUTO: 0.8 %
BUN SERPL-MCNC: 28 MG/DL
CALCIUM SERPL-MCNC: 10.3 MG/DL
CHLORIDE SERPL-SCNC: 103 MMOL/L
CHOLEST SERPL-MCNC: 243 MG/DL
CHOLEST/HDLC SERPL: 3.5 RATIO
CO2 SERPL-SCNC: 24 MMOL/L
CREAT SERPL-MCNC: 0.91 MG/DL
EOSINOPHIL # BLD AUTO: 0.18 K/UL
EOSINOPHIL NFR BLD AUTO: 3 %
ESTIMATED AVERAGE GLUCOSE: 114 MG/DL
GLUCOSE SERPL-MCNC: 103 MG/DL
HBA1C MFR BLD HPLC: 5.6 %
HCT VFR BLD CALC: 41.1 %
HDLC SERPL-MCNC: 69 MG/DL
HGB BLD-MCNC: 13.2 G/DL
IMM GRANULOCYTES NFR BLD AUTO: 0.5 %
LDLC SERPL CALC-MCNC: 150 MG/DL
LYMPHOCYTES # BLD AUTO: 1.84 K/UL
LYMPHOCYTES NFR BLD AUTO: 30.5 %
MAN DIFF?: NORMAL
MCHC RBC-ENTMCNC: 31.8 PG
MCHC RBC-ENTMCNC: 32.1 GM/DL
MCV RBC AUTO: 99 FL
MONOCYTES # BLD AUTO: 0.6 K/UL
MONOCYTES NFR BLD AUTO: 9.9 %
NEUTROPHILS # BLD AUTO: 3.34 K/UL
NEUTROPHILS NFR BLD AUTO: 55.3 %
PLATELET # BLD AUTO: 197 K/UL
POTASSIUM SERPL-SCNC: 4.9 MMOL/L
RBC # BLD: 4.15 M/UL
RBC # FLD: 12.7 %
SARS-COV-2 IGG SERPL IA-ACNC: 0.01 INDEX
SARS-COV-2 IGG SERPL QL IA: NEGATIVE
SODIUM SERPL-SCNC: 143 MMOL/L
TRIGL SERPL-MCNC: 123 MG/DL
WBC # FLD AUTO: 6.04 K/UL

## 2020-06-22 NOTE — ADDENDUM
[FreeTextEntry1] : Lipid panel: Total chol 233, \par CBC/BMP: WNL\par A1c: 5.6\par Vitamin D 49\par \par #1 Renal function stable with no abnormality\par #2 pre diabetes stable\par #3 osteopenia continue with daily vitamin D supplementation\par #4 COVID antibodies negative

## 2020-06-22 NOTE — ASSESSMENT
[FreeTextEntry1] : -PMH: HTN, HLD, CKD, Osteopenia, Pre-DM, Lumbar & Cervical Spinal stenosis\par -SH: . 4 children. Lives with her  in an apartment (2nd floor). Denies any falls. Still driving. \par \par WILLIS is a 84 year F whom is here today for f/u HTN, HLD, Osteopenia\par \par -F/u labs drawn in office today\par -Further recs pending lab results \par -Decrease Pravastatin to 40mg\par -RTO 6 mo for CPE

## 2020-07-06 ENCOUNTER — RX RENEWAL (OUTPATIENT)
Age: 85
End: 2020-07-06

## 2020-07-27 ENCOUNTER — RX RENEWAL (OUTPATIENT)
Age: 85
End: 2020-07-27

## 2020-08-19 NOTE — HISTORY OF PRESENT ILLNESS
[FreeTextEntry1] : F/u HTN [de-identified] : -PMH: HTN, HLD, CKD, Osteopenia, Pre-DM, Lumbar & Cervical Spinal stenosis\par -SH: . 4 children. Lives with her  in an apartment (2nd floor). Denies any falls. Still driving. \par \par WILLIS is a 84 year F whom is here today for f/u HTN, HLD, Osteopenia\par Today, pt reports feeling well and is w/o concerns \par No reported changes since last f/u visit\par \par -HTN: Remains on Amlodipine 10mg & Benzapril 40mg QD. Does not follow with cardio. No reported changes.\par -HLD: Remains on Pravastatin 80mg. No reported changes. \par -Osteopenia: (2018) DEXA. Remains on Vit-D3 sup. No reported changes  Total Volume (Ccs): 1

## 2020-12-14 ENCOUNTER — APPOINTMENT (OUTPATIENT)
Dept: INTERNAL MEDICINE | Facility: CLINIC | Age: 85
End: 2020-12-14
Payer: MEDICARE

## 2020-12-14 VITALS
OXYGEN SATURATION: 8 % | HEART RATE: 78 BPM | TEMPERATURE: 97.3 F | SYSTOLIC BLOOD PRESSURE: 126 MMHG | BODY MASS INDEX: 25.52 KG/M2 | RESPIRATION RATE: 14 BRPM | HEIGHT: 60 IN | WEIGHT: 130 LBS | DIASTOLIC BLOOD PRESSURE: 62 MMHG

## 2020-12-14 PROCEDURE — 99072 ADDL SUPL MATRL&STAF TM PHE: CPT

## 2020-12-14 PROCEDURE — 99214 OFFICE O/P EST MOD 30 MIN: CPT | Mod: 25

## 2020-12-14 PROCEDURE — 36415 COLL VENOUS BLD VENIPUNCTURE: CPT

## 2020-12-15 ENCOUNTER — NON-APPOINTMENT (OUTPATIENT)
Age: 85
End: 2020-12-15

## 2020-12-15 LAB
25(OH)D3 SERPL-MCNC: 52.4 NG/ML
ANION GAP SERPL CALC-SCNC: 11 MMOL/L
BUN SERPL-MCNC: 26 MG/DL
CALCIUM SERPL-MCNC: 10.1 MG/DL
CHLORIDE SERPL-SCNC: 103 MMOL/L
CHOLEST SERPL-MCNC: 236 MG/DL
CO2 SERPL-SCNC: 27 MMOL/L
CREAT SERPL-MCNC: 1.03 MG/DL
ESTIMATED AVERAGE GLUCOSE: 120 MG/DL
GLUCOSE SERPL-MCNC: 119 MG/DL
HBA1C MFR BLD HPLC: 5.8 %
HDLC SERPL-MCNC: 65 MG/DL
LDLC SERPL CALC-MCNC: 139 MG/DL
NONHDLC SERPL-MCNC: 172 MG/DL
POTASSIUM SERPL-SCNC: 4.7 MMOL/L
SODIUM SERPL-SCNC: 140 MMOL/L
TRIGL SERPL-MCNC: 162 MG/DL

## 2020-12-15 NOTE — ASSESSMENT
[FreeTextEntry1] : -PMH: HTN, HLD, CKD, Osteopenia, Pre-DM, Lumbar & Cervical Spinal stenosis\par -SH: . 4 children. Lives with her  in an apartment (2nd floor). Denies any falls. Still driving. \par \par WILLIS is a 84 year F whom is here today for f/u HTN, HLD, Osteopenia\par \par -F/u labs drawn in office today\par -Further recs pending lab results \par -Call Ins Co regarding Shingles Vaccine coverage \par -RTO 6 mo for CPE needs repeat DEXA

## 2020-12-15 NOTE — HISTORY OF PRESENT ILLNESS
[FreeTextEntry1] : F/u HTN [de-identified] : -PMH: HTN, HLD, CKD, Osteopenia, Pre-DM, Lumbar & Cervical Spinal stenosis\par -SH: . 4 children. Lives with her  in an apartment (2nd floor). Denies any falls. Still driving. \par \par WILLIS is a 84 year F whom is here today for f/u HTN, HLD\par Today, pt reports feeling well and is w/o concerns \par No reported changes since last f/u visit\par \par -HTN: Remains on Amlodipine 10mg & Benzapril 40mg QD.. No reported changes.\par -HLD: Remains on Pravastatin 40mg. No reported changes. \par -Osteopenia: (2018) DEXA. Remains on Vit-D3 sup. No reported changes

## 2020-12-15 NOTE — ADDENDUM
[FreeTextEntry1] : Cholesterol unchanged with lowered dose of pravastatin. Therefore continue with pravastatin 40 mg. Do not recommend lowering medication any further.\par \par Renal function stable and electrolytes all normal.\par \par Prediabetes is stable/unchanged there is likely an age-related component regarding this.\par \par Vitamin D WNL

## 2020-12-23 ENCOUNTER — RX RENEWAL (OUTPATIENT)
Age: 85
End: 2020-12-23

## 2021-02-12 ENCOUNTER — RX RENEWAL (OUTPATIENT)
Age: 86
End: 2021-02-12

## 2021-05-03 ENCOUNTER — APPOINTMENT (OUTPATIENT)
Dept: MAMMOGRAPHY | Facility: CLINIC | Age: 86
End: 2021-05-03
Payer: MEDICARE

## 2021-05-03 ENCOUNTER — RESULT REVIEW (OUTPATIENT)
Age: 86
End: 2021-05-03

## 2021-05-03 ENCOUNTER — OUTPATIENT (OUTPATIENT)
Dept: OUTPATIENT SERVICES | Facility: HOSPITAL | Age: 86
LOS: 1 days | End: 2021-05-03
Payer: MEDICARE

## 2021-05-03 DIAGNOSIS — Z12.31 ENCOUNTER FOR SCREENING MAMMOGRAM FOR MALIGNANT NEOPLASM OF BREAST: ICD-10-CM

## 2021-05-03 DIAGNOSIS — Z00.8 ENCOUNTER FOR OTHER GENERAL EXAMINATION: ICD-10-CM

## 2021-05-03 PROCEDURE — 77063 BREAST TOMOSYNTHESIS BI: CPT | Mod: 26

## 2021-05-03 PROCEDURE — 77063 BREAST TOMOSYNTHESIS BI: CPT

## 2021-05-03 PROCEDURE — 77067 SCR MAMMO BI INCL CAD: CPT

## 2021-05-03 PROCEDURE — 77067 SCR MAMMO BI INCL CAD: CPT | Mod: 26

## 2021-06-03 ENCOUNTER — APPOINTMENT (OUTPATIENT)
Dept: ORTHOPEDIC SURGERY | Facility: CLINIC | Age: 86
End: 2021-06-03
Payer: MEDICARE

## 2021-06-03 VITALS
SYSTOLIC BLOOD PRESSURE: 152 MMHG | HEART RATE: 82 BPM | WEIGHT: 133 LBS | DIASTOLIC BLOOD PRESSURE: 75 MMHG | HEIGHT: 60 IN | BODY MASS INDEX: 26.11 KG/M2

## 2021-06-03 DIAGNOSIS — E78.00 PURE HYPERCHOLESTEROLEMIA, UNSPECIFIED: ICD-10-CM

## 2021-06-03 DIAGNOSIS — H40.9 UNSPECIFIED GLAUCOMA: ICD-10-CM

## 2021-06-03 PROCEDURE — 99072 ADDL SUPL MATRL&STAF TM PHE: CPT

## 2021-06-03 PROCEDURE — 72100 X-RAY EXAM L-S SPINE 2/3 VWS: CPT

## 2021-06-03 PROCEDURE — 99214 OFFICE O/P EST MOD 30 MIN: CPT

## 2021-06-03 NOTE — PHYSICAL EXAM
[Poor Appearance] : well-appearing [Acute Distress] : not in acute distress [Obese] : not obese [de-identified] : CONSTITUTIONAL: The patient is a very pleasant individual who is well-nourished and who appears stated age.\par PSYCHIATRIC: The patient is alert and oriented X 3 and in no apparent distress, and participates with orthopedic evaluation well.\par HEAD: Atraumatic and is nonsyndromic in appearance.\par EENT: No visible thyromegaly, EOMI.\par RESPIRATORY: Respiratory rate is regular, not dyspneic on examination.\par LYMPHATICS: There is no inguinal lymphadenopathy\par INTEGUMENTARY: Skin is clean, dry, and intact about the bilateral lower extremities and lumbar spine.\par VASCULAR: There is brisk capillary refill about the bilateral lower extremities.\par NEUROLOGIC: There are no pathologic reflexes. There is no decrease in sensation of the bilateral lower extremities on Wartenberg pinwheel examination. Deep tendon reflexes are well maintained at 2+/4 of the bilateral lower extremities and are symmetric.\par MUSCULOSKELETAL: There is no visible muscular atrophy. Manual motor strength is well maintained in the bilateral lower extremities. Range of motion of lumbar spine is well maintained. The patient ambulates in a non-myelopathic manner. Negative tension sign and straight leg raise bilaterally. Quad extension, ankle dorsiflexion, EHL, plantar flexion, and ankle eversion are well preserved. Normal secondary orthopaedic exam of bilateral hips, greater trochanteric area, knees and ankles.  [de-identified] : X-ray of the lumbar spine taken September 2018 shows age appropriate lumbar spondylosis as well as moderate b/l hip arthritis. Mild pelvic obliquity.\par \par MRI from [facility] on 9/15/2018 shows age appropriate lumbar spondylosis. \par \par Xray of the lumbar spine taken 06/03/2021 demonstrates spondylolisthesis at L4-L5, severe multiple levels of lumbar degenerative disc disease.

## 2021-06-03 NOTE — HISTORY OF PRESENT ILLNESS
[de-identified] : 86 year old F Presents for follow up evaluation of several months of LE weakness left worse than right with an acute complaint of left groin and knee pain. She states if pain is severe if can spread to her left ankle. She states she hears cracking in her LLE often that isnt painful. chief complaint is that she can not ambulate without pain. Alleviating factors include leaning forward and sitting. She has completed PT at this time with no significant change in her pain. Patient refuses to use a cane or walker.  [Ataxia] : no ataxia [Incontinence] : no incontinence [Loss of Dexterity] : good dexterity [Urinary Ret.] : no urinary retention

## 2021-06-03 NOTE — DISCUSSION/SUMMARY
[de-identified] : Anticipatory guidance regarding LE weakness was given. I would not recommend surgical intervention at this time based on her age, however Patient has been referred to physiatry for assessment regarding their current pain level and possible injection therapy. Patient has been referred to Dr. Peace for Her hip and knee complaints. I advised light aerobic conditioning, also to begin use of a walker or cane. We discussed the maintenance that PT provides and that it may be helpful in preventing a decline. Patient to follow up on a PRN basis. \par \par Patient with multiple medical comorbidity increasing the risk of perioperative and postoperative complications as well as diminished spine outcomes as per the current medical literature.  These include but are not limited to obesity, anxiety/depression, cardiac illness, kidney disease, peripheral vascular disease, history of cancer, COPD, dysmetabolic syndrome including but not limited to diabetes, hyperlipidemia, hypertension.  Patient is being referred back to primary care provider for medical optimization, as well as other appropriate specialists as needed for optimization prior to spine surgery. \par \par Prior to appointment and during encounter with patient extensive medical records were reviewed including but not limited to, hospital records, out patient records, imaging results, and lab data. During this appointment the patient was examined, diagnoses were discussed and explained in a face to face manner. In addition extensive time was spent reviewing aforementioned diagnostic studies. Counseling including abnormal image results, differential diagnoses, treatment options, risk and benefits, lifestyle changes, current condition, and current medications was performed. Patient's comments, questions, and concerns were address and patient verbalized understanding. Based on this patient's presentation at our office, which is an orthopedic spine surgeon's office, this patient inherently / intrinsically has a risk, however minute, of developing  issues such as Cauda equina syndrome, bowel and bladder changes, or progression of motor or neurological deficits such as paralysis which may be permanent.\par \par \par

## 2021-06-03 NOTE — ADDENDUM
[FreeTextEntry1] : Documented by Lloyd Trujillo acting as a scribe for Loren Pate on 12/03/2020. All medical record entries made by the Scribe were at my, Loren Pate, direction and personally dictated by me on 12/03/2020 . I have reviewed the chart and agree that the record accurately reflects my personal performance of the history, physical exam, assessment and plan. I have also personally directed, reviewed, and agreed with the chart.

## 2021-06-08 ENCOUNTER — NON-APPOINTMENT (OUTPATIENT)
Age: 86
End: 2021-06-08

## 2021-06-08 ENCOUNTER — APPOINTMENT (OUTPATIENT)
Dept: INTERNAL MEDICINE | Facility: CLINIC | Age: 86
End: 2021-06-08
Payer: MEDICARE

## 2021-06-08 VITALS
OXYGEN SATURATION: 97 % | DIASTOLIC BLOOD PRESSURE: 68 MMHG | WEIGHT: 132 LBS | RESPIRATION RATE: 14 BRPM | HEIGHT: 60 IN | TEMPERATURE: 97.7 F | SYSTOLIC BLOOD PRESSURE: 160 MMHG | BODY MASS INDEX: 25.91 KG/M2 | HEART RATE: 81 BPM

## 2021-06-08 DIAGNOSIS — Z20.822 CONTACT WITH AND (SUSPECTED) EXPOSURE TO COVID-19: ICD-10-CM

## 2021-06-08 DIAGNOSIS — R22.1 LOCALIZED SWELLING, MASS AND LUMP, NECK: ICD-10-CM

## 2021-06-08 DIAGNOSIS — R73.03 PREDIABETES.: ICD-10-CM

## 2021-06-08 PROCEDURE — 36415 COLL VENOUS BLD VENIPUNCTURE: CPT

## 2021-06-08 PROCEDURE — 99072 ADDL SUPL MATRL&STAF TM PHE: CPT

## 2021-06-08 PROCEDURE — G0439: CPT

## 2021-06-08 PROCEDURE — 93000 ELECTROCARDIOGRAM COMPLETE: CPT

## 2021-06-08 NOTE — HISTORY OF PRESENT ILLNESS
[FreeTextEntry1] : Annual wellness visit [de-identified] : -PMH: HTN, HLD, Lumbar & Cervical Spinal stenosis\par -SH: . 4 children. Lives with her  in an apartment (2nd floor). Denies any falls. Still driving. \par \par WILLIS is a 84 year F whom is here today for an annual well check\par Today, pt reports feeling well and is w/o complaints\par She mentions that she will be going to see Pain mgt for management of her spinal stenosis\par \par Specialists: \par None\par \par Vaccines: All up t date. Declines repeat Shingles vaccine\par -DEXA: 1/2018. Scrip for repeat given today\par -Mammogram: 5/2021\par -FH of Breast., Colon or Ovarian CA: None known\par \par -HTN: Remains on Amlodipine 10mg & Benzapril 40mg QD. She is currently on Chronic Aleve. No reported changes.\par -HLD: Remains on Pravastatin 40mg. No reported changes. \par -Osteopenia: (2018) DEXA. Remains on Vit-D3 sup. No reported changes

## 2021-06-08 NOTE — HEALTH RISK ASSESSMENT
[Very Good] : ~his/her~  mood as very good [Yes] : Yes [2 - 4 times a month (2 pts)] : 2-4 times a month (2 points) [1 or 2 (0 pts)] : 1 or 2 (0 points) [Never (0 pts)] : Never (0 points) [No] : In the past 12 months have you used drugs other than those required for medical reasons? No [No falls in past year] : Patient reported no falls in the past year [0] : 2) Feeling down, depressed, or hopeless: Not at all (0) [Patient reported mammogram was normal] : Patient reported mammogram was normal [Patient reported bone density results were normal] : Patient reported bone density results were normal [None] : None [With Family] : lives with family [Retired] : retired [] :  [# Of Children ___] : has [unfilled] children [Fully functional (bathing, dressing, toileting, transferring, walking, feeding)] : Fully functional (bathing, dressing, toileting, transferring, walking, feeding) [Fully functional (using the telephone, shopping, preparing meals, housekeeping, doing laundry, using] : Fully functional and needs no help or supervision to perform IADLs (using the telephone, shopping, preparing meals, housekeeping, doing laundry, using transportation, managing medications and managing finances) [Reports changes in hearing] : Reports changes in hearing [Reviewed no changes] : Reviewed no changes [Designated Healthcare Proxy] : Designated healthcare proxy [Name: ___] : Health Care Proxy's Name: [unfilled]  [Relationship: ___] : Relationship: [unfilled] [] : No [Audit-CScore] : 2 [[facility]] : 10 [[id number]] : 0 [HIV test declined] : HIV test declined [Hepatitis C test declined] : Hepatitis C test declined [Change in mental status noted] : No change in mental status noted [Reports changes in vision] : Reports no changes in vision [MammogramDate] : 05/21 [BoneDensityDate] : 01/18 [AdvancecareDate] : 06/21

## 2021-06-08 NOTE — ASSESSMENT
[FreeTextEntry1] : -PMH: HTN, HLD, CKD, Osteopenia, Pre-DM, Lumbar & Cervical Spinal stenosis\par -SH: . 4 children. Lives with her  in an apartment (2nd floor). Denies any falls. Still driving. \par \par WILLIS is a 84 year F whom is here today for her Annual well visit\par \par Specialists:\par None\par \par EKG completed in office today demonstrates NSR with a nonspecific intraventricular conduction defect in lead V2. This EKG is unchanged dating back to 2016.\par \par -F/u labs drawn in office today\par -Further recs pending lab results \par -F/u DEXA\par -Still needs to Call [employer] regarding Shingles Vaccine coverage \par -RTO 2mo for BP check

## 2021-06-08 NOTE — PHYSICAL EXAM
[Well Nourished] : well nourished [Well Developed] : well developed [Well-Appearing] : well-appearing [Normal Sclera/Conjunctiva] : normal sclera/conjunctiva [PERRL] : pupils equal round and reactive to light [EOMI] : extraocular movements intact [Normal Outer Ear/Nose] : the outer ears and nose were normal in appearance [Normal Oropharynx] : the oropharynx was normal [No JVD] : no jugular venous distention [No Lymphadenopathy] : no lymphadenopathy [Supple] : supple [Thyroid Normal, No Nodules] : the thyroid was normal and there were no nodules present [No Respiratory Distress] : no respiratory distress  [No Accessory Muscle Use] : no accessory muscle use [Clear to Auscultation] : lungs were clear to auscultation bilaterally [Normal Rate] : normal rate  [Regular Rhythm] : with a regular rhythm [Normal S1, S2] : normal S1 and S2 [No Murmur] : no murmur heard [No Carotid Bruits] : no carotid bruits [No Abdominal Bruit] : a ~M bruit was not heard ~T in the abdomen [No Varicosities] : no varicosities [Pedal Pulses Present] : the pedal pulses are present [No Edema] : there was no peripheral edema [No Palpable Aorta] : no palpable aorta [Soft] : abdomen soft [No Extremity Clubbing/Cyanosis] : no extremity clubbing/cyanosis [Non Tender] : non-tender [Non-distended] : non-distended [No Masses] : no abdominal mass palpated [No HSM] : no HSM [Normal Bowel Sounds] : normal bowel sounds [Normal Posterior Cervical Nodes] : no posterior cervical lymphadenopathy [No CVA Tenderness] : no CVA  tenderness [Normal Anterior Cervical Nodes] : no anterior cervical lymphadenopathy [No Spinal Tenderness] : no spinal tenderness [No Joint Swelling] : no joint swelling [Grossly Normal Strength/Tone] : grossly normal strength/tone [No Rash] : no rash [Coordination Grossly Intact] : coordination grossly intact [No Focal Deficits] : no focal deficits [Deep Tendon Reflexes (DTR)] : deep tendon reflexes were 2+ and symmetric [Normal Gait] : normal gait [Normal Affect] : the affect was normal [Normal Insight/Judgement] : insight and judgment were intact

## 2021-06-09 ENCOUNTER — NON-APPOINTMENT (OUTPATIENT)
Age: 86
End: 2021-06-09

## 2021-06-09 LAB
25(OH)D3 SERPL-MCNC: 51.3 NG/ML
ALBUMIN SERPL ELPH-MCNC: 4.5 G/DL
ALP BLD-CCNC: 105 U/L
ALT SERPL-CCNC: 10 U/L
ANION GAP SERPL CALC-SCNC: 15 MMOL/L
AST SERPL-CCNC: 16 U/L
BASOPHILS # BLD AUTO: 0.04 K/UL
BASOPHILS NFR BLD AUTO: 0.7 %
BILIRUB SERPL-MCNC: 0.8 MG/DL
BUN SERPL-MCNC: 22 MG/DL
CALCIUM SERPL-MCNC: 10 MG/DL
CHLORIDE SERPL-SCNC: 104 MMOL/L
CHOLEST SERPL-MCNC: 234 MG/DL
CO2 SERPL-SCNC: 26 MMOL/L
CREAT SERPL-MCNC: 0.94 MG/DL
EOSINOPHIL # BLD AUTO: 0.16 K/UL
EOSINOPHIL NFR BLD AUTO: 2.9 %
ESTIMATED AVERAGE GLUCOSE: 120 MG/DL
GLUCOSE SERPL-MCNC: 113 MG/DL
HBA1C MFR BLD HPLC: 5.8 %
HCT VFR BLD CALC: 38.9 %
HDLC SERPL-MCNC: 65 MG/DL
HGB BLD-MCNC: 12.3 G/DL
IMM GRANULOCYTES NFR BLD AUTO: 0.4 %
LDLC SERPL CALC-MCNC: 138 MG/DL
LYMPHOCYTES # BLD AUTO: 1.6 K/UL
LYMPHOCYTES NFR BLD AUTO: 29.5 %
MAN DIFF?: NORMAL
MCHC RBC-ENTMCNC: 31.5 PG
MCHC RBC-ENTMCNC: 31.6 GM/DL
MCV RBC AUTO: 99.5 FL
MONOCYTES # BLD AUTO: 0.51 K/UL
MONOCYTES NFR BLD AUTO: 9.4 %
NEUTROPHILS # BLD AUTO: 3.1 K/UL
NEUTROPHILS NFR BLD AUTO: 57.1 %
NONHDLC SERPL-MCNC: 169 MG/DL
PLATELET # BLD AUTO: 187 K/UL
POTASSIUM SERPL-SCNC: 4.2 MMOL/L
PROT SERPL-MCNC: 6.6 G/DL
RBC # BLD: 3.91 M/UL
RBC # FLD: 13.3 %
SODIUM SERPL-SCNC: 144 MMOL/L
TRIGL SERPL-MCNC: 154 MG/DL
TSH SERPL-ACNC: 3.03 UIU/ML
WBC # FLD AUTO: 5.43 K/UL

## 2021-06-17 ENCOUNTER — APPOINTMENT (OUTPATIENT)
Dept: RADIOLOGY | Facility: CLINIC | Age: 86
End: 2021-06-17

## 2021-06-23 ENCOUNTER — APPOINTMENT (OUTPATIENT)
Age: 86
End: 2021-06-23
Payer: MEDICARE

## 2021-06-23 ENCOUNTER — OUTPATIENT (OUTPATIENT)
Dept: OUTPATIENT SERVICES | Facility: HOSPITAL | Age: 86
LOS: 1 days | End: 2021-06-23
Payer: MEDICARE

## 2021-06-23 DIAGNOSIS — M85.80 OTHER SPECIFIED DISORDERS OF BONE DENSITY AND STRUCTURE, UNSPECIFIED SITE: ICD-10-CM

## 2021-06-23 PROCEDURE — 77080 DXA BONE DENSITY AXIAL: CPT

## 2021-06-23 PROCEDURE — 77080 DXA BONE DENSITY AXIAL: CPT | Mod: 26

## 2021-06-24 ENCOUNTER — NON-APPOINTMENT (OUTPATIENT)
Age: 86
End: 2021-06-24

## 2021-06-29 NOTE — ED ADULT NURSE REASSESSMENT NOTE - CONDITION
unchanged Cyclophosphamide Counseling:  I discussed with the patient the risks of cyclophosphamide including but not limited to hair loss, hormonal abnormalities, decreased fertility, abdominal pain, diarrhea, nausea and vomiting, bone marrow suppression and infection. The patient understands that monitoring is required while taking this medication.

## 2021-07-02 ENCOUNTER — APPOINTMENT (OUTPATIENT)
Dept: PHYSICAL MEDICINE AND REHAB | Facility: CLINIC | Age: 86
End: 2021-07-02
Payer: MEDICARE

## 2021-07-02 VITALS
SYSTOLIC BLOOD PRESSURE: 171 MMHG | HEIGHT: 60 IN | TEMPERATURE: 97 F | DIASTOLIC BLOOD PRESSURE: 66 MMHG | HEART RATE: 86 BPM | WEIGHT: 133 LBS | RESPIRATION RATE: 14 BRPM | BODY MASS INDEX: 26.11 KG/M2

## 2021-07-02 DIAGNOSIS — Z78.9 OTHER SPECIFIED HEALTH STATUS: ICD-10-CM

## 2021-07-02 DIAGNOSIS — Z87.39 PERSONAL HISTORY OF OTHER DISEASES OF THE MUSCULOSKELETAL SYSTEM AND CONNECTIVE TISSUE: ICD-10-CM

## 2021-07-02 DIAGNOSIS — Z86.69 PERSONAL HISTORY OF OTHER DISEASES OF THE NERVOUS SYSTEM AND SENSE ORGANS: ICD-10-CM

## 2021-07-02 DIAGNOSIS — Z86.79 PERSONAL HISTORY OF OTHER DISEASES OF THE CIRCULATORY SYSTEM: ICD-10-CM

## 2021-07-02 PROCEDURE — 99072 ADDL SUPL MATRL&STAF TM PHE: CPT

## 2021-07-02 PROCEDURE — 99204 OFFICE O/P NEW MOD 45 MIN: CPT

## 2021-07-02 NOTE — ASSESSMENT
[FreeTextEntry1] : 86 y.o. F w/ clinical hip spine syndrome characterized by advanced lumbar DDD/spondylosis, L4-5 spondy/ stenosis, L L4/5 radiculopathy and likely left hip OA.  I spent most of today's visit (35 min) reviewing the patient's old MRI, discussing pathogenesis and further non-operative management.  Given h/o mild L4-5 stenosis on MRI 2018 and now w/ DF weakness, pt. need updated MRI study to evaluate progression of underlying stenosis.  May then consider LESI.  Also needs x-rays hip to gauge degree of DJD.  Advised caution w/ chronic use of PO NSAIDs 2' GI/cardiac/renal toxicities.  Rx P.T. for modalities, gentle ROM, stretching and strengthening exercises.  Pt. is in agreement with plan.  All questions answered.  RTC 1-2 weeks for imaging review.

## 2021-07-02 NOTE — DATA REVIEWED
[MRI] : MRI [FreeTextEntry1] : MRI L-spine (Sept. 2018): multilevel DDD worse at L5-S1 w/ near ankylosis of opposing endplates; Gr I anterolisthesis L4 on L5 -> mild central canal stenosis; mild-mod central canal stenosis L3-4 2' facet OA; L1-2 moderate central canal stenosis 2' R SIDED HNP + LIG FLAV THICKENING.

## 2021-07-02 NOTE — PHYSICAL EXAM
[FreeTextEntry1] : NAD\par A&Ox3\par Non-obese\par ROM L-spine: 1/2 full forward flexion before pain; <5' extension before pain\par ROM Hips: pain w/ IR left hip (groin); LBP w/ IR/ER right hip\par Pelvic tilt: ++\par Seated slump test: neg left\par SLR: neg left\par YULIA's: + left\par DTR's: 2+ knees; 1+ right ankle; absent left ankle\par MMT: 4/5 L TA\par Sensation: SILT\par Toe & Heel Walk: deferred\par \par

## 2021-07-02 NOTE — HISTORY OF PRESENT ILLNESS
[FreeTextEntry1] : 86 y.o. F w/ h/o HTN, CRI, glaucoma presents to office w/ c/o "spinal stenosis".  Pt. is c/o LBP radiating into left groin, knee and shin and dorsum of left foot.  Pt. states that she has been bothered by LBP for past 3 years but leg pain started about 6 months ago.  Pt. saw Dr. Posey who stated she was not candidate for surgery 2' her age.  MRI L-spine done and reviewed below.  Pt.'s last course of P.T. was about 3 years ago.  Takes 2 tabs Aleve which no longer helps.  No LESI.

## 2021-07-10 ENCOUNTER — RX RENEWAL (OUTPATIENT)
Age: 86
End: 2021-07-10

## 2021-07-16 ENCOUNTER — APPOINTMENT (OUTPATIENT)
Dept: RADIOLOGY | Facility: CLINIC | Age: 86
End: 2021-07-16
Payer: MEDICARE

## 2021-07-16 ENCOUNTER — OUTPATIENT (OUTPATIENT)
Dept: OUTPATIENT SERVICES | Facility: HOSPITAL | Age: 86
LOS: 1 days | End: 2021-07-16
Payer: MEDICARE

## 2021-07-16 ENCOUNTER — APPOINTMENT (OUTPATIENT)
Dept: MRI IMAGING | Facility: CLINIC | Age: 86
End: 2021-07-16
Payer: MEDICARE

## 2021-07-16 DIAGNOSIS — M48.062 SPINAL STENOSIS, LUMBAR REGION WITH NEUROGENIC CLAUDICATION: ICD-10-CM

## 2021-07-16 PROCEDURE — 73502 X-RAY EXAM HIP UNI 2-3 VIEWS: CPT | Mod: 26,LT

## 2021-07-16 PROCEDURE — 72148 MRI LUMBAR SPINE W/O DYE: CPT | Mod: 26

## 2021-07-16 PROCEDURE — 73502 X-RAY EXAM HIP UNI 2-3 VIEWS: CPT

## 2021-07-16 PROCEDURE — 72148 MRI LUMBAR SPINE W/O DYE: CPT

## 2021-07-27 ENCOUNTER — APPOINTMENT (OUTPATIENT)
Dept: PHYSICAL MEDICINE AND REHAB | Facility: CLINIC | Age: 86
End: 2021-07-27
Payer: MEDICARE

## 2021-07-27 VITALS
BODY MASS INDEX: 25.11 KG/M2 | WEIGHT: 133 LBS | DIASTOLIC BLOOD PRESSURE: 68 MMHG | RESPIRATION RATE: 14 BRPM | HEART RATE: 76 BPM | HEIGHT: 61 IN | SYSTOLIC BLOOD PRESSURE: 160 MMHG

## 2021-07-27 PROCEDURE — 99072 ADDL SUPL MATRL&STAF TM PHE: CPT

## 2021-07-27 PROCEDURE — 99214 OFFICE O/P EST MOD 30 MIN: CPT

## 2021-07-27 NOTE — ASSESSMENT
[FreeTextEntry1] : 86 y.o. 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  I spent most of today's visit (25 min) reviewing the patient's new MRI and x-rays, as well as discussing further non-operative management.   Given the patient's predominance of left hip and groin pain, I think it is reasonable to proceed with an US guided left hip CSI.  We may then consider the need for L L4-5 TFESI given the concordance of her shin pain, DF weakness and MRI findings.   Again, advised caution w/ chronic use of PO NSAIDs 2' GI/cardiac/renal toxicities.  Advised to c/w P.T. for modalities, gentle ROM, stretching and strengthening exercises.  Pt. is in agreement with plan.  All questions answered.  RTC 2-3 weeks for US guided LEFT HIP CSI.

## 2021-07-27 NOTE — DATA REVIEWED
[MRI] : MRI [FreeTextEntry1] : X-rays Pelvis and Hip (July 2021): No hip fractures or dislocations.  Lower lumbar spine degenerative change. Advanced bilateral hip osteoarthritis left greater than right and progressed from prior.  Intact pelvic and obturator rings and symmetrically aligned and spaced SI joints and pubic symphysis. Generalized osteopenia otherwise no discrete lytic or blastic lesions.\par \par MRI L-spine (July 2021): Grade 1 anterolisthesis at L5-S1 on a degenerative basis due to facet osteophytic hypertrophy. Multilevel degenerative disc disease and spondylosis at T11-12 through S1-S2 with loss of disc height and associated degenerative endplate changes. Disc bulges at these levels flatten the ventral thecal sac and narrow the BILATERAL neural foramina. Mild central stenosis at L2-3, L3-4 and moderate central stenosis at L4-5 and L5-S1 on a degenerative basis due to disc bulge, facet osteophytic hypertrophy and redundancy of ligamentum flavum. RIGHT lateral disc herniation noted at L1-2. Broad-based RIGHT paracentral disc herniation at L2-3 flattens the ventral thecal sac and markedly narrows the RIGHT neural foramen.  Severe L > R L4-5 NF stenosis.  \par \par MRI L-spine (Sept. 2018): multilevel DDD worse at L5-S1 w/ near ankylosis of opposing endplates; Gr I anterolisthesis L4 on L5 -> mild central canal stenosis; mild-mod central canal stenosis L3-4 2' facet OA; L1-2 moderate central canal stenosis 2' R SIDED HNP + LIG FLAV THICKENING.

## 2021-07-27 NOTE — HISTORY OF PRESENT ILLNESS
[FreeTextEntry1] : 86 y.o. F w/ h/o clinical hip spine syndrome characterized by advanced lumbar DDD/spondylosis, L4-5 spondy/ stenosis, L L4/5 radiculopathy and likely left hip OA returns to office for imaging review.  MRI L-spine and x-rays hip detailed below.

## 2021-07-27 NOTE — PHYSICAL EXAM
[FreeTextEntry1] : NAD\par A&Ox3\par Non-obese\par No significant change in today's examination\par ROM L-spine: 1/2 full forward flexion before pain; <5' extension before pain\par ROM Hips: pain w/ IR left hip (groin and lateral hip) - static; LBP w/ IR/ER right hip\par Pelvic tilt: ++\par Seated slump test: neg left\par SLR: neg left\par YULIA's: + left\par DTR's: 2+ knees; 1+ right ankle; absent left ankle\par MMT: 4/5 L TA\par Sensation: SILT\par Toe & Heel Walk: deferred\par \par

## 2021-08-01 ENCOUNTER — RX RENEWAL (OUTPATIENT)
Age: 86
End: 2021-08-01

## 2021-08-18 ENCOUNTER — APPOINTMENT (OUTPATIENT)
Dept: INTERNAL MEDICINE | Facility: CLINIC | Age: 86
End: 2021-08-18
Payer: MEDICARE

## 2021-08-18 VITALS
HEIGHT: 61 IN | OXYGEN SATURATION: 98 % | RESPIRATION RATE: 14 BRPM | TEMPERATURE: 97.7 F | SYSTOLIC BLOOD PRESSURE: 122 MMHG | WEIGHT: 130 LBS | BODY MASS INDEX: 24.55 KG/M2 | DIASTOLIC BLOOD PRESSURE: 76 MMHG | HEART RATE: 76 BPM

## 2021-08-18 DIAGNOSIS — L30.0 NUMMULAR DERMATITIS: ICD-10-CM

## 2021-08-18 PROCEDURE — 99214 OFFICE O/P EST MOD 30 MIN: CPT

## 2021-08-18 NOTE — HISTORY OF PRESENT ILLNESS
[FreeTextEntry1] : HTN, Gait Instability [de-identified] : -PMH: HTN, HLD, Lumbar & Cervical Spinal stenosis\par -SH: . 4 children. Lives with her  in an apartment (2nd floor). Denies any falls. Still driving. \par \par WILLIS is a 84 year F whom is here today for f/u uncontrolled HTN & Skin rash\par Today, pt reports feeling well and is w/o complaints\par She mentions that since our last visit, she followed up with ortho whom referred pt to Pain Mgt. She is attending PT now for 6-8 weeks. Plan to proceede with injections and consideration of Epidural injections. \par \par -HTN: Remains on Amlodipine 10mg & Benzapril 40mg QD. She is currently on Chronic Aleve. No reported changes.\par -HLD: Remains on Pravastatin 40mg. No reported changes.\par -Osteopenia: (2018) DEXA. Remains on Vit-D3 sup. No reported changes

## 2021-08-18 NOTE — ASSESSMENT
[FreeTextEntry1] : -PMH: HTN, HLD, CKD, Osteopenia, Pre-DM, Lumbar & Cervical Spinal stenosis\par -SH: . 4 children. Lives with her  in an apartment (2nd floor). Denies any falls. Still driving. \par \par WILLIS is a 84 year F whom is here today for f/u HTN\par \par Specialists:\par None\par \par -Still needs to Call [employer] regarding Shingles Vaccine coverage \par -RTO 3mo for routine f/u & labns or sooner if needed

## 2021-09-03 ENCOUNTER — APPOINTMENT (OUTPATIENT)
Dept: PHYSICAL MEDICINE AND REHAB | Facility: CLINIC | Age: 86
End: 2021-09-03
Payer: MEDICARE

## 2021-09-03 VITALS
SYSTOLIC BLOOD PRESSURE: 176 MMHG | BODY MASS INDEX: 25.11 KG/M2 | RESPIRATION RATE: 14 BRPM | HEIGHT: 61 IN | DIASTOLIC BLOOD PRESSURE: 66 MMHG | HEART RATE: 85 BPM | WEIGHT: 133 LBS

## 2021-09-03 PROCEDURE — 20611 DRAIN/INJ JOINT/BURSA W/US: CPT | Mod: LT

## 2021-10-01 ENCOUNTER — APPOINTMENT (OUTPATIENT)
Dept: PHYSICAL MEDICINE AND REHAB | Facility: CLINIC | Age: 86
End: 2021-10-01
Payer: MEDICARE

## 2021-10-01 VITALS
BODY MASS INDEX: 25.11 KG/M2 | DIASTOLIC BLOOD PRESSURE: 67 MMHG | RESPIRATION RATE: 14 BRPM | HEIGHT: 61 IN | WEIGHT: 133 LBS | HEART RATE: 78 BPM | SYSTOLIC BLOOD PRESSURE: 148 MMHG

## 2021-10-01 PROCEDURE — 99214 OFFICE O/P EST MOD 30 MIN: CPT

## 2021-10-01 NOTE — ASSESSMENT
[FreeTextEntry1] : 86 y.o. 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  I spent most of today's visit (25 min) discussing further non-operative management.   Pt. had modest response to US guided left hip CSI.  No role for PRP injection given advanced DJD.  I deferred L L4-5 TFESI at this time as the patient seems to be more symptomatic from her left hip than from a L L4/5 radic.   Again, advised caution w/ chronic use of PO NSAIDs 2' GI/cardiac/renal toxicities.  Discussed R/B/A to low dose tramadol 50 mg; 1/2 - 1 tab po bid prn as tolerated.  Risks include but not limited to GI, CNS and respiratory dysfunction.   Advised to c/w P.T. for modalities, gentle ROM, stretching and strengthening exercises.  Pt. is in agreement with plan.  All questions answered.  RTC 6 weeks.

## 2021-10-01 NOTE — HISTORY OF PRESENT ILLNESS
[FreeTextEntry1] : 86 y.o. F w/ h/o clinical hip spine syndrome characterized by advanced lumbar DDD/spondylosis, L4-5 spondy/ stenosis, L L4/5 radiculopathy and left > right hip OA returns to office for f/u s/p US guided left hip CSI (9/3/21).  Pt. reports 25% symptomatic relief post-procedure.  Pt. states that she is better able to walk but is still limited.  She is no longer reaching for the walls when walking.  Pt. states that she is not a candidate for a TKA.  She decreased her consumption of Aleve to prn.

## 2021-10-04 ENCOUNTER — RX RENEWAL (OUTPATIENT)
Age: 86
End: 2021-10-04

## 2021-11-15 ENCOUNTER — APPOINTMENT (OUTPATIENT)
Dept: PHYSICAL MEDICINE AND REHAB | Facility: CLINIC | Age: 86
End: 2021-11-15

## 2021-12-16 ENCOUNTER — APPOINTMENT (OUTPATIENT)
Dept: INTERNAL MEDICINE | Facility: CLINIC | Age: 86
End: 2021-12-16
Payer: MEDICARE

## 2021-12-16 VITALS — TEMPERATURE: 97.6 F | HEART RATE: 81 BPM | RESPIRATION RATE: 16 BRPM | OXYGEN SATURATION: 97 %

## 2021-12-16 VITALS — BODY MASS INDEX: 25.13 KG/M2 | DIASTOLIC BLOOD PRESSURE: 68 MMHG | WEIGHT: 133 LBS | SYSTOLIC BLOOD PRESSURE: 128 MMHG

## 2021-12-16 LAB — 25(OH)D3 SERPL-MCNC: 54.2 NG/ML

## 2021-12-16 PROCEDURE — 99214 OFFICE O/P EST MOD 30 MIN: CPT | Mod: 25

## 2021-12-16 PROCEDURE — 36415 COLL VENOUS BLD VENIPUNCTURE: CPT

## 2021-12-16 RX ORDER — TRAMADOL HYDROCHLORIDE 50 MG/1
50 TABLET, COATED ORAL
Qty: 15 | Refills: 0 | Status: DISCONTINUED | COMMUNITY
Start: 2021-10-01 | End: 2021-12-16

## 2021-12-16 NOTE — ASSESSMENT
[FreeTextEntry1] : -PMH: HTN, HLD, CKD, Osteopenia, Pre-DM, Lumbar & Cervical Spinal stenosis\par -SH: . 4 children. Lives with her  in an apartment (2nd floor). Denies any falls. Still driving. \par \par WILLIS is a 86 year F whom is here today for f/u HTN, HLD, CKD\par \par Specialists:\par None\par \par -F/u labs drawn in office today\par -Further recs pending lab results\par -Still needs to Call [employer] regarding Shingles Vaccine coverage \par -RTO 6mo for CPE or sooner if needed

## 2021-12-16 NOTE — HISTORY OF PRESENT ILLNESS
[FreeTextEntry1] : HTN, Gait Instability [de-identified] : -PMH: HTN, HLD, CKD, Osteopenia, Pre-DM, Lumbar & Cervical Spinal stenosis, Severe Hip OA\par -SH: . 4 children. Lives w/ her  in an apartment (2nd floor). Still driving. Non-smoker. Occasional EtOH use\par \par WILLIS is a 86 year F whom is here today for f/u HTN, HLD, CKD\par Today, pt reports feeling well but mentions that her Hip OA which is \par \par -HTN: Remains on Amlodipine 10mg & Benzapril 40mg QD. She is currently on Chronic Aleve. No reported changes.\par -HLD: Remains on Pravastatin 40mg. No reported changes.\par -Osteopenia: (6/2021) DEXA: Normal. Remains on Vit-D\par -B/l Hip OA:

## 2021-12-17 LAB
ANION GAP SERPL CALC-SCNC: 13 MMOL/L
BUN SERPL-MCNC: 22 MG/DL
CALCIUM SERPL-MCNC: 10.1 MG/DL
CHLORIDE SERPL-SCNC: 102 MMOL/L
CHOLEST SERPL-MCNC: 210 MG/DL
CO2 SERPL-SCNC: 26 MMOL/L
CREAT SERPL-MCNC: 0.99 MG/DL
GLUCOSE SERPL-MCNC: 120 MG/DL
HDLC SERPL-MCNC: 72 MG/DL
LDLC SERPL CALC-MCNC: 116 MG/DL
NONHDLC SERPL-MCNC: 137 MG/DL
POTASSIUM SERPL-SCNC: 4.5 MMOL/L
SODIUM SERPL-SCNC: 141 MMOL/L
TRIGL SERPL-MCNC: 105 MG/DL

## 2021-12-20 ENCOUNTER — NON-APPOINTMENT (OUTPATIENT)
Age: 86
End: 2021-12-20

## 2022-01-06 ENCOUNTER — RX RENEWAL (OUTPATIENT)
Age: 87
End: 2022-01-06

## 2022-02-03 ENCOUNTER — RX RENEWAL (OUTPATIENT)
Age: 87
End: 2022-02-03

## 2022-02-21 ENCOUNTER — RX RENEWAL (OUTPATIENT)
Age: 87
End: 2022-02-21

## 2022-05-16 ENCOUNTER — OUTPATIENT (OUTPATIENT)
Dept: OUTPATIENT SERVICES | Facility: HOSPITAL | Age: 87
LOS: 1 days | End: 2022-05-16
Payer: MEDICARE

## 2022-05-16 ENCOUNTER — APPOINTMENT (OUTPATIENT)
Dept: MAMMOGRAPHY | Facility: CLINIC | Age: 87
End: 2022-05-16
Payer: MEDICARE

## 2022-05-16 ENCOUNTER — RESULT REVIEW (OUTPATIENT)
Age: 87
End: 2022-05-16

## 2022-05-16 DIAGNOSIS — Z00.8 ENCOUNTER FOR OTHER GENERAL EXAMINATION: ICD-10-CM

## 2022-05-16 PROCEDURE — 77067 SCR MAMMO BI INCL CAD: CPT

## 2022-05-16 PROCEDURE — 77063 BREAST TOMOSYNTHESIS BI: CPT | Mod: 26

## 2022-05-16 PROCEDURE — 77063 BREAST TOMOSYNTHESIS BI: CPT

## 2022-05-16 PROCEDURE — 77067 SCR MAMMO BI INCL CAD: CPT | Mod: 26

## 2022-06-14 ENCOUNTER — APPOINTMENT (OUTPATIENT)
Dept: INTERNAL MEDICINE | Facility: CLINIC | Age: 87
End: 2022-06-14
Payer: MEDICARE

## 2022-06-14 VITALS
BODY MASS INDEX: 22.28 KG/M2 | HEART RATE: 80 BPM | HEIGHT: 61 IN | DIASTOLIC BLOOD PRESSURE: 70 MMHG | OXYGEN SATURATION: 97 % | WEIGHT: 118 LBS | SYSTOLIC BLOOD PRESSURE: 122 MMHG | TEMPERATURE: 97.2 F

## 2022-06-14 PROCEDURE — 36415 COLL VENOUS BLD VENIPUNCTURE: CPT

## 2022-06-14 PROCEDURE — 99214 OFFICE O/P EST MOD 30 MIN: CPT | Mod: 25

## 2022-06-14 RX ORDER — METHYLPREDNISOLONE ACETATE 40 MG/ML
40 INJECTION, SUSPENSION INTRA-ARTICULAR; INTRALESIONAL; INTRAMUSCULAR; SOFT TISSUE
Qty: 0.5 | Refills: 0 | Status: DISCONTINUED | COMMUNITY
Start: 2020-06-03 | End: 2022-06-14

## 2022-06-14 RX ORDER — MELOXICAM 7.5 MG/1
7.5 TABLET ORAL DAILY
Qty: 14 | Refills: 0 | Status: DISCONTINUED | COMMUNITY
Start: 2020-06-03 | End: 2022-06-14

## 2022-06-14 RX ORDER — TRIAMCINOLONE ACETONIDE 1 MG/G
0.1 CREAM TOPICAL 3 TIMES DAILY
Qty: 1 | Refills: 1 | Status: DISCONTINUED | COMMUNITY
Start: 2021-08-18 | End: 2022-06-14

## 2022-06-14 NOTE — HISTORY OF PRESENT ILLNESS
[FreeTextEntry1] : HTN, Gait Instability [de-identified] : -PMH: HTN, HLD, CKD, Osteopenia, Pre-DM, Lumbar & Cervical Spinal stenosis\par -SH: . 4 children. Lives with her  in an apartment (2nd floor). Denies any falls. Still driving. \par \par WILLIS is a 87 year F whom is here today for f/u HTN, HLD, CKD\par Today, pt reports feeling well \par \par -HTN: Remains on Amlodipine 10mg & Benzapril 40mg QD. She is currently on Chronic Aleve. No reported changes.\par -HLD: Remains on Pravastatin 40mg. No reported changes.\par -Osteopenia: (6/2021) DEXA: Normal. Remains on Vit-D\par -B/l Hip OA & Spinal Stenosis:  No improvement w/ PT. Declines recommended Oral medications.

## 2022-06-14 NOTE — ASSESSMENT
[FreeTextEntry1] : -PMH: HTN, HLD, CKD, Osteopenia, Pre-DM, Lumbar & Cervical Spinal stenosis\par -SH: . 4 children. Lives with her  in an apartment (2nd floor). Denies any falls. Still driving. \par \par WILLIS is a 87 year F whom is here today for f/u HTN, HLD, CKD\par \par Specialists:\par None\par \par -F/u labs drawn in office today\par -Further recs pending lab results\par -RTO 3-6mo for CPE or sooner if needed

## 2022-06-14 NOTE — PHYSICAL EXAM
[Normal] : no carotid or abdominal bruits heard, no varicosities, pedal pulses are present, no peripheral edema, no extremity clubbing or cyanosis and no palpable aorta Valtrex Pregnancy And Lactation Text: this medication is Pregnancy Category B and is considered safe during pregnancy. This medication is not directly found in breast milk but it's metabolite acyclovir is present.

## 2022-06-15 ENCOUNTER — NON-APPOINTMENT (OUTPATIENT)
Age: 87
End: 2022-06-15

## 2022-06-15 LAB
ALBUMIN SERPL ELPH-MCNC: 4.5 G/DL
ALP BLD-CCNC: 100 U/L
ALT SERPL-CCNC: 10 U/L
ANION GAP SERPL CALC-SCNC: 16 MMOL/L
AST SERPL-CCNC: 17 U/L
BASOPHILS # BLD AUTO: 0.05 K/UL
BASOPHILS NFR BLD AUTO: 0.8 %
BILIRUB SERPL-MCNC: 0.8 MG/DL
BUN SERPL-MCNC: 25 MG/DL
CALCIUM SERPL-MCNC: 10.2 MG/DL
CHLORIDE SERPL-SCNC: 102 MMOL/L
CHOLEST SERPL-MCNC: 221 MG/DL
CO2 SERPL-SCNC: 24 MMOL/L
CREAT SERPL-MCNC: 1.02 MG/DL
EGFR: 53 ML/MIN/1.73M2
EOSINOPHIL # BLD AUTO: 0.22 K/UL
EOSINOPHIL NFR BLD AUTO: 3.4 %
FOLATE SERPL-MCNC: 9.5 NG/ML
GLUCOSE SERPL-MCNC: 107 MG/DL
HCT VFR BLD CALC: 37.9 %
HDLC SERPL-MCNC: 75 MG/DL
HGB BLD-MCNC: 12.5 G/DL
IMM GRANULOCYTES NFR BLD AUTO: 0.6 %
LDLC SERPL CALC-MCNC: 128 MG/DL
LYMPHOCYTES # BLD AUTO: 1.67 K/UL
LYMPHOCYTES NFR BLD AUTO: 25.7 %
MAN DIFF?: NORMAL
MCHC RBC-ENTMCNC: 31.8 PG
MCHC RBC-ENTMCNC: 33 GM/DL
MCV RBC AUTO: 96.4 FL
MONOCYTES # BLD AUTO: 0.67 K/UL
MONOCYTES NFR BLD AUTO: 10.3 %
NEUTROPHILS # BLD AUTO: 3.84 K/UL
NEUTROPHILS NFR BLD AUTO: 59.2 %
NONHDLC SERPL-MCNC: 146 MG/DL
PLATELET # BLD AUTO: 193 K/UL
POTASSIUM SERPL-SCNC: 4.3 MMOL/L
PROT SERPL-MCNC: 6.8 G/DL
RBC # BLD: 3.93 M/UL
RBC # FLD: 13.7 %
SODIUM SERPL-SCNC: 142 MMOL/L
TRIGL SERPL-MCNC: 94 MG/DL
TSH SERPL-ACNC: 2.25 UIU/ML
VIT B12 SERPL-MCNC: 459 PG/ML
WBC # FLD AUTO: 6.49 K/UL

## 2022-11-02 ENCOUNTER — APPOINTMENT (OUTPATIENT)
Dept: PHYSICAL MEDICINE AND REHAB | Facility: CLINIC | Age: 87
End: 2022-11-02

## 2022-11-02 PROCEDURE — 73502 X-RAY EXAM HIP UNI 2-3 VIEWS: CPT | Mod: LT

## 2022-11-02 PROCEDURE — 99214 OFFICE O/P EST MOD 30 MIN: CPT

## 2022-11-02 NOTE — HISTORY OF PRESENT ILLNESS
[FreeTextEntry1] : 87 y.o. F w/ h/o clinical hip spine syndrome characterized by advanced lumbar DDD/spondylosis, L4-5 spondy/ stenosis, L L4/5 radiculopathy and left > right hip OA returns to office for f/u.  Pt. last seen October 2021.  Pt. denies interval change in her medical hx since last visit.  Pt. is describing b/l hip, groin and leg pain which makes it difficult for her to walk.  She has little lower back pain.  Denies N/T/B in legs or feet.  Pt. had US guided LEFT HIP CSI (Sept. 2021) w/o much relief of sxs.  Of note, she reports that she requires higher doses of LA for injection and all medical procedures.  Last course of P.T. ended this time last year.  Also went for acupuncture.

## 2022-11-02 NOTE — DATA REVIEWED
[MRI] : MRI [FreeTextEntry1] : X-rays Pelvis and Left Hip (1 view AP and 2 views left hip) obtained at today's office visit: advanced, bilateral, hip arthrosis w/ near complete loss of joint space, subchondral sclerosis and flattening weight bearing portion of femoral heads.\par \par X-rays Pelvis and Hip (July 2021): No hip fractures or dislocations.  Lower lumbar spine degenerative change. Advanced bilateral hip osteoarthritis left greater than right and progressed from prior.  Intact pelvic and obturator rings and symmetrically aligned and spaced SI joints and pubic symphysis. Generalized osteopenia otherwise no discrete lytic or blastic lesions.\par \par MRI L-spine (July 2021): Grade 1 anterolisthesis at L5-S1 on a degenerative basis due to facet osteophytic hypertrophy. Multilevel degenerative disc disease and spondylosis at T11-12 through S1-S2 with loss of disc height and associated degenerative endplate changes. Disc bulges at these levels flatten the ventral thecal sac and narrow the BILATERAL neural foramina. Mild central stenosis at L2-3, L3-4 and moderate central stenosis at L4-5 and L5-S1 on a degenerative basis due to disc bulge, facet osteophytic hypertrophy and redundancy of ligamentum flavum. RIGHT lateral disc herniation noted at L1-2. Broad-based RIGHT paracentral disc herniation at L2-3 flattens the ventral thecal sac and markedly narrows the RIGHT neural foramen.  Severe L > R L4-5 NF stenosis.  \par \par MRI L-spine (Sept. 2018): multilevel DDD worse at L5-S1 w/ near ankylosis of opposing endplates; Gr I anterolisthesis L4 on L5 -> mild central canal stenosis; mild-mod central canal stenosis L3-4 2' facet OA; L1-2 moderate central canal stenosis 2' R SIDED HNP + LIG FLAV THICKENING.

## 2022-11-02 NOTE — ASSESSMENT
[FreeTextEntry1] : 87 y.o. 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  I spent most of today's visit (25 min) reviewing the patient's new x-rays, discussing etiology, pathogenesis and further non-operative vs. operative management.  X-rays c/w .  However, pt. states that she has been told that she is not an ideal candidate for BETO given her advanced age and likely reduced kidney function.  Although pt. had modest response to US guided left hip CSI (Sept. 2021), after further discussion of R/B/A she would like to repeat the injection.  No role for PRP injection given the advanced nature of her DJD.  I again deferred L L4-5 TFESI at this time as the patient seems to be more symptomatic from her left hip than from a L L4/5 radiculopathy.   Again, advised caution w/ chronic use of PO NSAIDs 2' GI/cardiac/renal toxicities.  Discussed R/B/A to low dose tramadol 50 mg; 1/2 - 1 tab po bid prn which patient does not want to take 2' risk of CNS depression.  May try OTC Lidoderm patches as directed.  Pt. is in agreement with plan.  All questions answered.  RTC for US guided left hip CSI.

## 2022-11-02 NOTE — PHYSICAL EXAM
[FreeTextEntry1] : NAD\par A&Ox3\par Non-obese\par ROM L-spine: 1/2 full forward flexion before pain; <5' extension before pain\par ROM Hips: R hip 20' IR w/ pain; 50' ER w/o pain; L hip 20' IR and 30' ER w/ pain  \par Pelvic tilt: ++ left \par Seated slump test: neg left\par SLR: neg left\par YULIA's: + bilaterally (right + new)\par DTR's: 2+ knees; 1+ right ankle; absent left ankle (chronic)\par MMT: 4/5 B/L TA (chronic)\par Sensation: SILT\par Toe & Heel Walk: deferred\par \par

## 2022-11-08 ENCOUNTER — APPOINTMENT (OUTPATIENT)
Dept: PHYSICAL MEDICINE AND REHAB | Facility: CLINIC | Age: 87
End: 2022-11-08

## 2022-11-08 VITALS
HEIGHT: 61 IN | WEIGHT: 117 LBS | HEART RATE: 78 BPM | BODY MASS INDEX: 22.09 KG/M2 | SYSTOLIC BLOOD PRESSURE: 144 MMHG | DIASTOLIC BLOOD PRESSURE: 72 MMHG | RESPIRATION RATE: 12 BRPM

## 2022-11-08 PROCEDURE — 99214 OFFICE O/P EST MOD 30 MIN: CPT | Mod: 25

## 2022-11-08 PROCEDURE — 20611 DRAIN/INJ JOINT/BURSA W/US: CPT | Mod: LT

## 2022-11-08 NOTE — PROCEDURE
[de-identified] : Reason for procedure: LEFT HIP PAIN\par \par Procedure: US GUIDED LEFT HIP CSI\par Physician: NESS VEGA DO\par Medication injected: 40 mg Kenalog, 2 cc Lidocaine 1% and 2 cc NS (total)\par Sedation medications: None\par Estimated blood loss: None\par Complications: None\par \par Technique:  R/B/A to USG LEFT hip joint injection reviewed with patient. The patient is agreeable and wishes to proceed.  Signed consent form to be scanned into EMR.  The patient was placed in supine position. Utilizing ultrasound, the femoral neck, femoroacetabular junction, overlying vessels and muscles were identified. The area was prepped in normal sterile fashion with Chloroprep x3.  Local anesthesia with 1% lidocaine was provided with 25-G, 1.5" needle.  After adequate anesthesia was obtained, a 22-G, 3.5" spinal needle was advanced under sonographic guidance toward the femoral neck.  After negative aspiration of heme, the above medications were injected into the joint space under direct US visualization.  The needle was then removed, band aid placed over injection site.  There were no complications.  Post injection instructions provided.  The patient noted improvement in pain and ROM after injection. \par \par

## 2022-11-08 NOTE — ASSESSMENT
[FreeTextEntry1] : 87 y.o. 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  I spent most of today's visit (35 min) reviewing and performing the patient's US guided left hip CSI, discussing etiology, pathogenesis and further non-operative vs. operative management.  Depending on how patient responds to today's injection, we may consider injecting her right hip.  Pt. has been told that she is not an ideal candidate for BETO given her advanced age and likely reduced kidney function.  No role for PRP injection given the advanced nature of her DJD.  I again deferred L L4-5 TFESI at this time as the patient seems to be more symptomatic from her left hip than from a L L4/5 radiculopathy.   Again, advised caution w/ chronic use of PO NSAIDs 2' GI/cardiac/renal toxicities.  Discussed R/B/A to low dose tramadol 50 mg; 1/2 - 1 tab po bid prn which patient does not want to take 2' risk of CNS depression.  May c/w OTC Lidoderm patches as directed.  Pt. is in agreement with plan.  All questions answered.  RTC 2 weeks.

## 2022-11-08 NOTE — HISTORY OF PRESENT ILLNESS
[FreeTextEntry1] : 87 y.o. 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  Results detailed below.

## 2022-11-29 ENCOUNTER — APPOINTMENT (OUTPATIENT)
Dept: PHYSICAL MEDICINE AND REHAB | Facility: CLINIC | Age: 87
End: 2022-11-29

## 2022-11-29 VITALS
SYSTOLIC BLOOD PRESSURE: 157 MMHG | HEART RATE: 87 BPM | BODY MASS INDEX: 22.09 KG/M2 | WEIGHT: 117 LBS | RESPIRATION RATE: 14 BRPM | DIASTOLIC BLOOD PRESSURE: 69 MMHG | HEIGHT: 61 IN

## 2022-11-29 PROCEDURE — 99214 OFFICE O/P EST MOD 30 MIN: CPT

## 2022-11-29 PROCEDURE — 73562 X-RAY EXAM OF KNEE 3: CPT | Mod: 50

## 2022-11-29 NOTE — ASSESSMENT
[FreeTextEntry1] : 87 y.o. 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  I spent most of today's visit (35 min) reviewing the patient's knee x-rays, discussing etiology, pathogenesis and further non-operative vs. operative management.  The patient had a very good response to the ultrasound guided corticosteroid injection into her left hip.  At this time, she appears to be more bothered by pain in her left patellofemoral articulation.  We discussed risks, benefits and alternatives to ultrasound guided left patella femoral local anesthetic block to rule this out as a pain generator.  If the patient experiences more than 70 to 80% pain relief post procedure, we may then consider a PRP injection.   If the patient experiences relatively little relief postprocedure, we may then revisit the possibility of a transforaminal epidural steroid injection at L4-L5.   Again, advised caution w/ chronic use of PO NSAIDs 2' GI/cardiac/renal toxicities.  May c/w OTC Lidoderm patches as directed.  Pt. is in agreement with plan.  All questions answered.  RTC for US guided L knee PF LA block.

## 2022-11-29 NOTE — PHYSICAL EXAM
[FreeTextEntry1] : NAD\par A&Ox3\par Non-obese\par ROM L-spine: 1/2 full forward flexion before pain; <5' extension before pain\par ROM Hips: R hip 20' IR w/ pain; 50' ER w/o pain; L hip 20' IR and 30' ER w/ pain  \par Pelvic tilt: ++ left \par Seated slump test: neg left\par SLR: neg left\par YULIA's: + bilaterally (right + new)\par DTR's: 2+ knees; 1+ right ankle; absent left ankle (chronic)\par MMT: 4/5 B/L TA (chronic)\par Sensation: SILT\par Toe & Heel Walk: deferred\par Inspection Knees: no bony abnormality\par Effusion: none\par ROM: left knee 0'-145' flexion\par JLT: none\par Patella: + patella grind left\par Stability: stable V/V stresses\par Gurdeep's: neg\par  \par

## 2022-11-29 NOTE — HISTORY OF PRESENT ILLNESS
[FreeTextEntry1] : 87 y.o. F w/ h/o clinical hip spine syndrome characterized by advanced lumbar DDD/spondylosis, L4-5 spondy/ stenosis, L L4/5 radiculopathy and left > right hip OA returns to office for f/u s/p US guided left hip CSI (11/8/22).  Pt. reports approximately 85% symptomatic improvement post-procedure.  Although she is walking more comfortably, she is still bothered by LBP and pain in both knees.  She relates h/o remote left patella fx.  No recent x-rays knees.  She describes constant "popping" sensations in left knee; not right knee.  At times, the patient experiences sharp shooting pains radiating into the left shin.  She is not sure if these are associated with the lower back discomfort but it does not seem to be.

## 2022-11-29 NOTE — DATA REVIEWED
[MRI] : MRI [FreeTextEntry1] : X-rays of both knees (3 views each) obtained at today's office visit: moderate medial compartment narrowing right knee; mild medial compartment narrowing left knee; small, well corticated enthesophyte off distal inferior pole left patella.  \par \par X-rays Pelvis and Left Hip (1 view AP and 2 views left hip) obtained at today's office visit: advanced, bilateral, hip arthrosis w/ near complete loss of joint space, subchondral sclerosis and flattening weight bearing portion of femoral heads.\par \par X-rays Pelvis and Hip (July 2021): No hip fractures or dislocations.  Lower lumbar spine degenerative change. Advanced bilateral hip osteoarthritis left greater than right and progressed from prior.  Intact pelvic and obturator rings and symmetrically aligned and spaced SI joints and pubic symphysis. Generalized osteopenia otherwise no discrete lytic or blastic lesions.\par \par MRI L-spine (July 2021): Grade 1 anterolisthesis at L5-S1 on a degenerative basis due to facet osteophytic hypertrophy. Multilevel degenerative disc disease and spondylosis at T11-12 through S1-S2 with loss of disc height and associated degenerative endplate changes. Disc bulges at these levels flatten the ventral thecal sac and narrow the BILATERAL neural foramina. Mild central stenosis at L2-3, L3-4 and moderate central stenosis at L4-5 and L5-S1 on a degenerative basis due to disc bulge, facet osteophytic hypertrophy and redundancy of ligamentum flavum. RIGHT lateral disc herniation noted at L1-2. Broad-based RIGHT paracentral disc herniation at L2-3 flattens the ventral thecal sac and markedly narrows the RIGHT neural foramen.  Severe L > R L4-5 NF stenosis.  \par \par MRI L-spine (Sept. 2018): multilevel DDD worse at L5-S1 w/ near ankylosis of opposing endplates; Gr I anterolisthesis L4 on L5 -> mild central canal stenosis; mild-mod central canal stenosis L3-4 2' facet OA; L1-2 moderate central canal stenosis 2' R SIDED HNP + LIG FLAV THICKENING.

## 2022-12-02 NOTE — ED ADULT TRIAGE NOTE - CHIEF COMPLAINT QUOTE
vag bleed today in the shower. no pain. never happened. has clots
You can access the FollowMyHealth Patient Portal offered by [facility] by registering at the following website: [email or web address]. By joining [facility]’s FollowMyHealth portal, you will also be able to view your health information using other applications (apps) compatible with our system.

## 2022-12-07 ENCOUNTER — APPOINTMENT (OUTPATIENT)
Dept: PHYSICAL MEDICINE AND REHAB | Facility: CLINIC | Age: 87
End: 2022-12-07

## 2022-12-07 PROCEDURE — 20611 DRAIN/INJ JOINT/BURSA W/US: CPT | Mod: LT

## 2022-12-07 PROCEDURE — 99214 OFFICE O/P EST MOD 30 MIN: CPT | Mod: 25

## 2022-12-07 NOTE — PROCEDURE
[de-identified] : PROCEDURE NOTE: R/B/A to US - guided left knee patellofemoral femoral articulation local anesthetic block reviewed w/ patient. Pt. is agreeable and wishes to proceed. Signed consent form to be scanned into EMR.  The patient was placed in a supine position with the affected knee flexed to 90 degrees and supported by bolster.  Pre-procedural scanning identified the proximal edge of the patella and underlying trochlear cartilage.  Using an in plane technique, a 25-gauge, 2 inch needle was advanced just superficial to the trochlear cartilage into the patellofemoral articulation. This was followed by instillation of 3 cc 2% lidocaine with visualization under color Doppler.  The patient tolerated the procedure well without adverse effects. A band aid and ice pack was applied. Post-injection instructions given.\par

## 2022-12-07 NOTE — PHYSICAL EXAM
[FreeTextEntry1] : NAD\par A&Ox3\par Non-obese\par No significant change in today's examination\par ROM L-spine: 1/2 full forward flexion before pain; <5' extension before pain\par ROM Hips: R hip 20' IR w/ pain; 50' ER w/o pain; L hip 20' IR and 30' ER w/ pain  \par Pelvic tilt: ++ left \par Seated slump test: neg left\par SLR: neg left\par YULIA's: + bilaterally (right + new)\par DTR's: 2+ knees; 1+ right ankle; absent left ankle (chronic)\par MMT: 4/5 B/L TA (chronic)\par Sensation: SILT\par Toe & Heel Walk: deferred\par Inspection Knees: no bony abnormality\par Effusion: none\par ROM: left knee 0'-145' flexion\par JLT: none\par Patella: + patella grind left\par Stability: stable V/V stresses\par Gurdeep's: neg\par  \par

## 2022-12-07 NOTE — ASSESSMENT
[FreeTextEntry1] : 87 y.o. 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  I spent most of today's visit (35 min) reviewing and performing the patient's ultrasound guided left knee patellar femoral local anesthetic block, discussing etiology, pathogenesis and further non-operative vs. operative management.  The patient reports near 100% pain relief post procedure.  She still describes a snapping sensation about the knee which is not painful.  Given the patient's positive response, we discussed the risks, benefits and alternatives to a hyaluronic acid injection versus PRP.  We have chosen to proceed with a hyaluronic acid injection as it is likely covered under the patient's insurance.  If that fails, she may then consider PRP.  At this time, I see no role for a transforaminal epidural steroid injection at L4-L5 even though the patient still has axial lower back pain..   Again, advised caution w/ chronic use of PO NSAIDs 2' GI/cardiac/renal toxicities.  May c/w OTC Lidoderm patches as directed.  Pt. is in agreement with plan.  All questions answered.  RTC for US guided L knee PF hyaluronic acid injection.

## 2022-12-07 NOTE — HISTORY OF PRESENT ILLNESS
[FreeTextEntry1] : 87 y.o. F w/ h/o clinical hip spine syndrome characterized by advanced lumbar DDD/spondylosis, L4-5 spondy/ stenosis, L L4/5 radiculopathy and left > right hip OA returns to office for US guided left PF articulation LA block.  Results detailed below.

## 2022-12-13 ENCOUNTER — APPOINTMENT (OUTPATIENT)
Dept: PHYSICAL MEDICINE AND REHAB | Facility: CLINIC | Age: 87
End: 2022-12-13

## 2023-01-30 ENCOUNTER — NON-APPOINTMENT (OUTPATIENT)
Age: 88
End: 2023-01-30

## 2023-01-30 ENCOUNTER — APPOINTMENT (OUTPATIENT)
Dept: INTERNAL MEDICINE | Facility: CLINIC | Age: 88
End: 2023-01-30
Payer: MEDICARE

## 2023-01-30 VITALS
TEMPERATURE: 97.9 F | DIASTOLIC BLOOD PRESSURE: 80 MMHG | OXYGEN SATURATION: 97 % | HEIGHT: 61 IN | WEIGHT: 113 LBS | SYSTOLIC BLOOD PRESSURE: 153 MMHG | BODY MASS INDEX: 21.34 KG/M2 | HEART RATE: 73 BPM

## 2023-01-30 DIAGNOSIS — Z13.6 ENCOUNTER FOR SCREENING FOR CARDIOVASCULAR DISORDERS: ICD-10-CM

## 2023-01-30 PROCEDURE — 93000 ELECTROCARDIOGRAM COMPLETE: CPT

## 2023-01-30 PROCEDURE — G0439: CPT

## 2023-01-30 PROCEDURE — 36415 COLL VENOUS BLD VENIPUNCTURE: CPT

## 2023-01-30 NOTE — HISTORY OF PRESENT ILLNESS
[FreeTextEntry1] : Annual wellness visit [de-identified] : -PMH: HTN, HLD, CKD, Osteopenia, Pre-DM, Lumbar & Cervical Spinal stenosis\par -SH: . 4 children. Lives with her  in an apartment (2nd floor). Denies any falls. Still driving. \par \par WILLIS is a 88 year F whom is here today for her Annual well visit\par \par Specialists:\par -PM&R: Dr. Usman Staton\par \par Vaccines: All up to date. Declines repeat Shingles vaccine\par -DEXA: 6/2021\par -Mammogram: 5/2022\par -FH of Breast., Colon or Ovarian CA: None known\par \par -HTN: Remains on Amlodipine 10mg & Benzapril 40mg QD. She is currently on Chronic Aleve. No reported changes.\par -HLD: Remains on Pravastatin 40mg. No reported changes.\par \par -Osteopenia: (6/2021) DEXA: Normal. Remains on Vit-D\par -B/l Hip OA & Spinal Stenosis: No improvement w/ PT. Declines recommended Oral medications.

## 2023-01-30 NOTE — ASSESSMENT
[FreeTextEntry1] : -PMH: HTN, HLD, CKD, Osteopenia, Pre-DM, Lumbar & Cervical Spinal stenosis\par -SH: . 4 children. Lives with her  in an apartment (2nd floor). Denies any falls. Still driving. \par \par WILLIS is a 88 year F whom is here today for her Annual well visit\par \par Specialists:\par -PM&R: Dr. Usman Staton\par \par EKG completed in office today demonstrates NSR with a nonspecific intraventricular conduction defect in lead V2. This EKG is unchanged dating back to 2016.\par \par -F/u labs drawn in office today\par -Further recs pending lab results\par -RTO 6mo for routine f/u & labs or sooner if needed.

## 2023-01-30 NOTE — HEALTH RISK ASSESSMENT
[Very Good] : ~his/her~  mood as very good [Yes] : Yes [2 - 4 times a month (2 pts)] : 2-4 times a month (2 points) [1 or 2 (0 pts)] : 1 or 2 (0 points) [Never (0 pts)] : Never (0 points) [No] : In the past 12 months have you used drugs other than those required for medical reasons? No [No falls in past year] : Patient reported no falls in the past year [0] : 2) Feeling down, depressed, or hopeless: Not at all (0) [Patient reported mammogram was normal] : Patient reported mammogram was normal [Patient reported bone density results were normal] : Patient reported bone density results were normal [HIV test declined] : HIV test declined [Hepatitis C test declined] : Hepatitis C test declined [None] : None [With Family] : lives with family [Retired] : retired [] :  [# Of Children ___] : has [unfilled] children [Fully functional (bathing, dressing, toileting, transferring, walking, feeding)] : Fully functional (bathing, dressing, toileting, transferring, walking, feeding) [Fully functional (using the telephone, shopping, preparing meals, housekeeping, doing laundry, using] : Fully functional and needs no help or supervision to perform IADLs (using the telephone, shopping, preparing meals, housekeeping, doing laundry, using transportation, managing medications and managing finances) [Never] : Never [PHQ-2 Negative - No further assessment needed] : PHQ-2 Negative - No further assessment needed [Reviewed no changes] : Reviewed, no changes [Audit-CScore] : 2 [[facility]] : 10 [GDK0Mptvl] : 0 [Change in mental status noted] : No change in mental status noted [Reports changes in hearing] : Reports no changes in hearing [Reports changes in vision] : Reports no changes in vision [MammogramDate] : 05/22 [BoneDensityDate] : 06/21 [AdvancecareDate] : 01/23

## 2023-01-31 LAB
25(OH)D3 SERPL-MCNC: 63.3 NG/ML
ALBUMIN SERPL ELPH-MCNC: 4.3 G/DL
ALP BLD-CCNC: 103 U/L
ALT SERPL-CCNC: 13 U/L
ANION GAP SERPL CALC-SCNC: 13 MMOL/L
AST SERPL-CCNC: 19 U/L
BASOPHILS # BLD AUTO: 0.07 K/UL
BASOPHILS NFR BLD AUTO: 1.3 %
BILIRUB SERPL-MCNC: 0.6 MG/DL
BUN SERPL-MCNC: 25 MG/DL
CALCIUM SERPL-MCNC: 10.1 MG/DL
CHLORIDE SERPL-SCNC: 101 MMOL/L
CHOLEST SERPL-MCNC: 197 MG/DL
CO2 SERPL-SCNC: 27 MMOL/L
CREAT SERPL-MCNC: 0.9 MG/DL
EGFR: 61 ML/MIN/1.73M2
EOSINOPHIL # BLD AUTO: 0.16 K/UL
EOSINOPHIL NFR BLD AUTO: 3 %
FOLATE SERPL-MCNC: 9.5 NG/ML
GLUCOSE SERPL-MCNC: 98 MG/DL
HCT VFR BLD CALC: 37.5 %
HDLC SERPL-MCNC: 75 MG/DL
HGB BLD-MCNC: 12.2 G/DL
IMM GRANULOCYTES NFR BLD AUTO: 0.4 %
LDLC SERPL CALC-MCNC: 104 MG/DL
LYMPHOCYTES # BLD AUTO: 1.23 K/UL
LYMPHOCYTES NFR BLD AUTO: 23.2 %
MAN DIFF?: NORMAL
MCHC RBC-ENTMCNC: 32.3 PG
MCHC RBC-ENTMCNC: 32.5 GM/DL
MCV RBC AUTO: 99.2 FL
MONOCYTES # BLD AUTO: 0.5 K/UL
MONOCYTES NFR BLD AUTO: 9.4 %
NEUTROPHILS # BLD AUTO: 3.33 K/UL
NEUTROPHILS NFR BLD AUTO: 62.7 %
NONHDLC SERPL-MCNC: 122 MG/DL
PLATELET # BLD AUTO: 215 K/UL
POTASSIUM SERPL-SCNC: 4.3 MMOL/L
PROT SERPL-MCNC: 6.2 G/DL
RBC # BLD: 3.78 M/UL
RBC # FLD: 13.2 %
SODIUM SERPL-SCNC: 141 MMOL/L
TRIGL SERPL-MCNC: 94 MG/DL
VIT B12 SERPL-MCNC: 370 PG/ML
WBC # FLD AUTO: 5.31 K/UL

## 2023-02-08 ENCOUNTER — APPOINTMENT (OUTPATIENT)
Dept: PHYSICAL MEDICINE AND REHAB | Facility: CLINIC | Age: 88
End: 2023-02-08
Payer: MEDICARE

## 2023-02-08 PROCEDURE — 20611 DRAIN/INJ JOINT/BURSA W/US: CPT | Mod: LT

## 2023-02-08 PROCEDURE — 99214 OFFICE O/P EST MOD 30 MIN: CPT | Mod: 25

## 2023-02-08 NOTE — PROCEDURE
[de-identified] : PROCEDURE NOTE: R/B/A to US - guided left knee patellofemoral articulation GELSYN-3 HA INJECTION reviewed w/ patient. Pt. is agreeable and wishes to proceed. Signed consent form to be scanned into EMR.  The patient was placed in a supine position with the affected knee flexed to 90 degrees and supported by bolster.  Pre-procedural scanning identified the proximal edge of the patella and underlying trochlear cartilage.  Using an in plane technique, a 21-gauge, 1.5 inch needle was advanced just superficial to the trochlear cartilage into the patellofemoral articulation. This was followed by instillation of 3 cc GELSYN-3 with visualization under color Doppler.  The patient tolerated the procedure well without adverse effects. A band aid and ice pack was applied. Post-injection instructions given.\par

## 2023-02-08 NOTE — ASSESSMENT
[FreeTextEntry1] : 88 y.o. 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  I spent most of today's visit (25 min) reviewing and performing the patient's ultrasound guided left knee patellar femoral GELSYN-3 HA injection, discussing etiology, pathogenesis and further non-operative vs. operative management.   The patient tolerated today's injection procedure well without adverse effects.  Regarding her lower back pain, I see no role for a transforaminal epidural steroid injection at L4-L5.   Again, advised caution w/ chronic use of PO NSAIDs 2' GI/cardiac/renal toxicities.  May c/w OTC Lidoderm patches as directed.  Pt. is in agreement with plan.  All questions answered.  RTC for second Gelsyn–3 hyaluronic acid injection next week.

## 2023-02-08 NOTE — HISTORY OF PRESENT ILLNESS
[FreeTextEntry1] : 88 y.o. 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  Results detailed below.

## 2023-02-15 ENCOUNTER — APPOINTMENT (OUTPATIENT)
Dept: PHYSICAL MEDICINE AND REHAB | Facility: CLINIC | Age: 88
End: 2023-02-15
Payer: MEDICARE

## 2023-02-15 PROCEDURE — 99214 OFFICE O/P EST MOD 30 MIN: CPT | Mod: 25

## 2023-02-15 PROCEDURE — 20611 DRAIN/INJ JOINT/BURSA W/US: CPT | Mod: LT

## 2023-02-15 NOTE — ASSESSMENT
[FreeTextEntry1] : 88 y.o. 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  I spent most of today's visit (25 min) reviewing and performing the patient's ultrasound guided left knee patellar femoral GELSYN-3 HA injection, discussing etiology, pathogenesis and further non-operative vs. operative management.   The patient tolerated today's injection procedure well without adverse effects.  She still reports good relief from the ultrasound-guided left hip injection we did in November 2022.  Regarding her lower back pain, I see no role for a transforaminal epidural steroid injection at L4-L5.  Again, advised caution w/ chronic use of PO NSAIDs 2' GI/cardiac/renal toxicities.  May c/w OTC Lidoderm patches as directed.  Pt. is in agreement with plan.  All questions answered.  RTC for 3rd Gelsyn–3 hyaluronic acid injection next week.

## 2023-02-15 NOTE — HISTORY OF PRESENT ILLNESS
[FreeTextEntry1] : 88 y.o. F w/ h/o clinical hip spine syndrome characterized by advanced lumbar DDD/spondylosis, L4-5 spondy/ stenosis, L L4/5 radiculopathy, left > right hip OA and left knee PF OA returns to office for follow-up and second ultrasound guided left knee patella femoral hyaluronic acid injection.  Results detailed below.

## 2023-02-15 NOTE — DATA REVIEWED
[FreeTextEntry1] : X-rays of both knees (3 views each) obtained at today's office visit: moderate medial compartment narrowing right knee; mild medial compartment narrowing left knee; small, well corticated enthesophyte off distal inferior pole left patella.  \par \par X-rays Pelvis and Left Hip (1 view AP and 2 views left hip) obtained at today's office visit: advanced, bilateral, hip arthrosis w/ near complete loss of joint space, subchondral sclerosis and flattening weight bearing portion of femoral heads.\par \par X-rays Pelvis and Hip (July 2021): No hip fractures or dislocations.  Lower lumbar spine degenerative change. Advanced bilateral hip osteoarthritis left greater than right and progressed from prior.  Intact pelvic and obturator rings and symmetrically aligned and spaced SI joints and pubic symphysis. Generalized osteopenia otherwise no discrete lytic or blastic lesions.\par \par MRI L-spine (July 2021): Grade 1 anterolisthesis at L5-S1 on a degenerative basis due to facet osteophytic hypertrophy. Multilevel degenerative disc disease and spondylosis at T11-12 through S1-S2 with loss of disc height and associated degenerative endplate changes. Disc bulges at these levels flatten the ventral thecal sac and narrow the BILATERAL neural foramina. Mild central stenosis at L2-3, L3-4 and moderate central stenosis at L4-5 and L5-S1 on a degenerative basis due to disc bulge, facet osteophytic hypertrophy and redundancy of ligamentum flavum. RIGHT lateral disc herniation noted at L1-2. Broad-based RIGHT paracentral disc herniation at L2-3 flattens the ventral thecal sac and markedly narrows the RIGHT neural foramen.  Severe L > R L4-5 NF stenosis.  \par \par MRI L-spine (Sept. 2018): multilevel DDD worse at L5-S1 w/ near ankylosis of opposing endplates; Gr I anterolisthesis L4 on L5 -> mild central canal stenosis; mild-mod central canal stenosis L3-4 2' facet OA; L1-2 moderate central canal stenosis 2' R SIDED HNP + LIG FLAV THICKENING.

## 2023-02-15 NOTE — PROCEDURE
[de-identified] : PROCEDURE NOTE: R/B/A to US - guided left knee patellofemoral articulation GELSYN-3 HA INJECTION reviewed w/ patient. Pt. is agreeable and wishes to proceed. Signed consent form to be scanned into EMR.  The patient was placed in a supine position with the affected knee flexed to 90 degrees and supported by bolster.  Pre-procedural scanning with a curvilinear probe identified the proximal edge of the patella and underlying trochlear cartilage.  Using an in plane technique, a 21-gauge, 1.5 inch needle was advanced just superficial to the trochlear cartilage into the patellofemoral articulation. This was followed by instillation of 3 cc GELSYN-3 with visualization under color Doppler.  The patient tolerated the procedure well without adverse effects. A band aid and ice pack was applied. Post-injection instructions given.\par

## 2023-02-22 ENCOUNTER — APPOINTMENT (OUTPATIENT)
Dept: PHYSICAL MEDICINE AND REHAB | Facility: CLINIC | Age: 88
End: 2023-02-22
Payer: MEDICARE

## 2023-02-22 VITALS
HEIGHT: 61 IN | HEART RATE: 85 BPM | BODY MASS INDEX: 21.34 KG/M2 | DIASTOLIC BLOOD PRESSURE: 70 MMHG | WEIGHT: 113 LBS | SYSTOLIC BLOOD PRESSURE: 145 MMHG

## 2023-02-22 DIAGNOSIS — M48.062 SPINAL STENOSIS, LUMBAR REGION WITH NEUROGENIC CLAUDICATION: ICD-10-CM

## 2023-02-22 PROCEDURE — 20611 DRAIN/INJ JOINT/BURSA W/US: CPT | Mod: LT

## 2023-02-22 PROCEDURE — 99214 OFFICE O/P EST MOD 30 MIN: CPT | Mod: 25

## 2023-02-22 NOTE — PROCEDURE
[de-identified] : PROCEDURE NOTE: R/B/A to US - guided left knee patellofemoral articulation GELSYN-3 HA INJECTION reviewed w/ patient. Pt. is agreeable and wishes to proceed. Signed consent form to be scanned into EMR.  The patient was placed in a supine position with the affected knee flexed to 90 degrees and supported by bolster.  Pre-procedural scanning with a curvilinear probe identified the proximal edge of the patella and underlying trochlear cartilage.  Using an in plane technique, a 21-gauge, 1.5 inch needle was advanced just superficial to the trochlear cartilage into the patellofemoral articulation. This was followed by instillation of 3 cc GELSYN-3 with visualization under color Doppler.  The patient tolerated the procedure well without adverse effects. A band aid and ice pack was applied. Post-injection instructions given.\par

## 2023-02-22 NOTE — ASSESSMENT
[FreeTextEntry1] : 88 y.o. 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  I spent most of today's visit (25 min) reviewing and performing the patient's ultrasound guided left knee patellar femoral GELSYN-3 HA injection, discussing etiology, pathogenesis and further non-operative vs. operative management.   The patient tolerated today's injection procedure well without adverse effects.  She still reports good relief from the ultrasound-guided left hip injection we did in November 2022.  Regarding her lower back pain, I see no role for a transforaminal epidural steroid injection at L4-L5.  Again, advised caution w/ chronic use of PO NSAIDs 2' GI/cardiac/renal toxicities.  May c/w OTC Lidoderm patches as directed.  Pt. is in agreement with plan.  All questions answered.  RTC 4 weeks.

## 2023-02-22 NOTE — HISTORY OF PRESENT ILLNESS
[FreeTextEntry1] : 88 y.o. 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  Results detailed below.  The patient states that she is beginning to feel relief after her second injection.  She still reports good relief of pain after her ultrasound-guided left hip corticosteroid injection performed several months ago.

## 2023-02-23 ENCOUNTER — RX RENEWAL (OUTPATIENT)
Age: 88
End: 2023-02-23

## 2023-03-07 ENCOUNTER — NON-APPOINTMENT (OUTPATIENT)
Age: 88
End: 2023-03-07

## 2023-03-08 ENCOUNTER — APPOINTMENT (OUTPATIENT)
Dept: INTERNAL MEDICINE | Facility: CLINIC | Age: 88
End: 2023-03-08
Payer: MEDICARE

## 2023-03-08 PROCEDURE — 99441: CPT

## 2023-03-08 NOTE — HISTORY OF PRESENT ILLNESS
[Home] : at home, [unfilled] , at the time of the visit. [Medical Office: ([facility])___] : at the medical office located in  [Verbal consent obtained from patient] : the patient, [unfilled] [FreeTextEntry8] : -PMH: HTN, HLD, CKD, Osteopenia, Pre-DM, Lumbar & Cervical Spinal stenosis\par -SH: . 4 children. Lives with her  in an apartment (2nd floor). Denies any falls. Still driving. \par \par WILLIS is a 88 year F whom reports feeling down and tearful following the recent loss of her  just a few days ago.  Patient was on hospice for a short period of time prior to his loss.  Patient reports good family support.  She started the recommended Remeron 7.5 mg at bedtime.  Has only taken 1 dose.Denies SI/HI

## 2023-03-22 ENCOUNTER — APPOINTMENT (OUTPATIENT)
Dept: PHYSICAL MEDICINE AND REHAB | Facility: CLINIC | Age: 88
End: 2023-03-22
Payer: MEDICARE

## 2023-03-22 VITALS
DIASTOLIC BLOOD PRESSURE: 62 MMHG | BODY MASS INDEX: 21.34 KG/M2 | WEIGHT: 113 LBS | HEIGHT: 61 IN | HEART RATE: 90 BPM | SYSTOLIC BLOOD PRESSURE: 164 MMHG

## 2023-03-22 PROCEDURE — 99214 OFFICE O/P EST MOD 30 MIN: CPT

## 2023-03-22 NOTE — ASSESSMENT
[FreeTextEntry1] : 88 y.o. 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  I spent most of today's visit (25 min) discussing etiology, pathogenesis and further non-operative vs. operative management.   The patient responded quite well to the series of hyaluronic acid injections to the left knee.  It appears that the ultrasound-guided left hip injection we did in November 2022 has likely worn off which is to be expected.  I explained that repeat corticosteroid injections risks of osteonecrosis and fracture.  We discussed the risks, benefits and alternatives to ultrasound guided PRP injections into her hip; however, the patient understands that this therapy is still considered experimental/ investigational and is not covered by conventional insurance.  Of note, the patient's last CBC showed a platelet count to 15 and an H/H of 12.2/37.5.  Regarding her lower back pain, I see no role for a transforaminal epidural steroid injection at L4-L5 given her lack of radicular pain complaints.  Now that her kidney function has improved, we discussed the risks, benefits and alternatives to low-dose meloxicam 7.5 mg; 1 tab p.o. every other day as tolerated.  The patient deferred a prescription for physical therapy at this time as she is still receiving home physical therapy services.  Pt. is in agreement with plan.  All questions answered.  Patient will call the office and let us know how she wishes to proceed.

## 2023-03-22 NOTE — HISTORY OF PRESENT ILLNESS
[FreeTextEntry1] : 88 y.o. 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 Pt. reports 75% pain relief post procedure.  She endorses more LBP and b/l groin pain.  She is able to walk short distances w/ assistance of quad cane  or RW.  If her pain is pain, she take 2 tabs OTC naproxen.  Has not been using Lido patches.

## 2023-04-17 ENCOUNTER — RX RENEWAL (OUTPATIENT)
Age: 88
End: 2023-04-17

## 2023-05-05 ENCOUNTER — RX RENEWAL (OUTPATIENT)
Age: 88
End: 2023-05-05

## 2023-05-26 ENCOUNTER — APPOINTMENT (OUTPATIENT)
Dept: MAMMOGRAPHY | Facility: CLINIC | Age: 88
End: 2023-05-26
Payer: MEDICARE

## 2023-05-26 ENCOUNTER — RESULT REVIEW (OUTPATIENT)
Age: 88
End: 2023-05-26

## 2023-05-26 ENCOUNTER — OUTPATIENT (OUTPATIENT)
Dept: OUTPATIENT SERVICES | Facility: HOSPITAL | Age: 88
LOS: 1 days | End: 2023-05-26
Payer: MEDICARE

## 2023-05-26 DIAGNOSIS — Z00.00 ENCOUNTER FOR GENERAL ADULT MEDICAL EXAMINATION WITHOUT ABNORMAL FINDINGS: ICD-10-CM

## 2023-05-26 PROCEDURE — 77063 BREAST TOMOSYNTHESIS BI: CPT

## 2023-05-26 PROCEDURE — 77067 SCR MAMMO BI INCL CAD: CPT

## 2023-05-26 PROCEDURE — 77067 SCR MAMMO BI INCL CAD: CPT | Mod: 26

## 2023-05-26 PROCEDURE — 77063 BREAST TOMOSYNTHESIS BI: CPT | Mod: 26

## 2023-06-01 ENCOUNTER — APPOINTMENT (OUTPATIENT)
Dept: INTERNAL MEDICINE | Facility: CLINIC | Age: 88
End: 2023-06-01
Payer: MEDICARE

## 2023-06-01 VITALS
SYSTOLIC BLOOD PRESSURE: 140 MMHG | OXYGEN SATURATION: 98 % | RESPIRATION RATE: 14 BRPM | HEART RATE: 78 BPM | HEIGHT: 61 IN | BODY MASS INDEX: 20.39 KG/M2 | TEMPERATURE: 97.8 F | WEIGHT: 108 LBS | DIASTOLIC BLOOD PRESSURE: 60 MMHG

## 2023-06-01 PROCEDURE — 99214 OFFICE O/P EST MOD 30 MIN: CPT | Mod: 25

## 2023-06-01 PROCEDURE — 36415 COLL VENOUS BLD VENIPUNCTURE: CPT

## 2023-06-01 NOTE — HISTORY OF PRESENT ILLNESS
[FreeTextEntry1] : HTN, HLD, CKD, Depression, Weight loss [de-identified] : -PMH: HTN, HLD, CKD, Osteopenia, Pre-DM, Lumbar & Cervical Spinal stenosis\par -SH: . 4 children. Lives alone. Denies any falls. Still driving. \par \par WILLIS is a 88 year F whom is here today for f/u HTN, HLD, CKD, Depression, Weight loss\par reports feeling down and depressed following the loss of her  3mo ago\par she stopped the remeron because she didn’t realize she needed to continue it \par \par -HTN: Remains on Amlodipine 10mg & Benzapril 40mg QD. No reported changes.\par -HLD: Remains on Pravastatin 40mg. No reported changes.\par \par -Osteopenia: (6/2021) DEXA: Normal. Remains on Vit-D\par -B/l Hip OA & Spinal Stenosis: No improvement w/ PT. Declines recommended Oral medications.

## 2023-06-01 NOTE — ASSESSMENT
[FreeTextEntry1] : -PMH: HTN, HLD, CKD, Osteopenia, Pre-DM, Lumbar & Cervical Spinal stenosis\par -SH: . 4 children. Lives with her  in an apartment (2nd floor). Denies any falls. Still driving. \par \par WILLIS is a 88 year F whom is here today for f/u HTN, HLD, CKD, Depression, Weight loss\par \par Specialists:\par -PM&R: Dr. Usman Staton\par \par -F/u labs drawn in office today\par -Further recs pending lab results\par -RTO 2mo\par -RTO 6mo for CPE or sooner if needed.  No

## 2023-06-02 LAB
ALBUMIN SERPL ELPH-MCNC: 4.5 G/DL
ALP BLD-CCNC: 110 U/L
ALT SERPL-CCNC: 9 U/L
ANION GAP SERPL CALC-SCNC: 11 MMOL/L
AST SERPL-CCNC: 17 U/L
BILIRUB SERPL-MCNC: 0.8 MG/DL
BUN SERPL-MCNC: 28 MG/DL
CALCIUM SERPL-MCNC: 10.2 MG/DL
CHLORIDE SERPL-SCNC: 104 MMOL/L
CHOLEST SERPL-MCNC: 190 MG/DL
CO2 SERPL-SCNC: 28 MMOL/L
CREAT SERPL-MCNC: 1.07 MG/DL
EGFR: 50 ML/MIN/1.73M2
GLUCOSE SERPL-MCNC: 163 MG/DL
HDLC SERPL-MCNC: 80 MG/DL
LDLC SERPL CALC-MCNC: 83 MG/DL
NONHDLC SERPL-MCNC: 110 MG/DL
POTASSIUM SERPL-SCNC: 4.3 MMOL/L
PROT SERPL-MCNC: 6.6 G/DL
SODIUM SERPL-SCNC: 144 MMOL/L
TRIGL SERPL-MCNC: 137 MG/DL

## 2023-06-15 DIAGNOSIS — Z23 ENCOUNTER FOR IMMUNIZATION: ICD-10-CM

## 2023-07-17 ENCOUNTER — RX RENEWAL (OUTPATIENT)
Age: 88
End: 2023-07-17

## 2023-07-19 ENCOUNTER — RX RENEWAL (OUTPATIENT)
Age: 88
End: 2023-07-19

## 2023-08-01 ENCOUNTER — APPOINTMENT (OUTPATIENT)
Dept: PHYSICAL MEDICINE AND REHAB | Facility: CLINIC | Age: 88
End: 2023-08-01
Payer: MEDICARE

## 2023-08-01 VITALS
WEIGHT: 109 LBS | SYSTOLIC BLOOD PRESSURE: 150 MMHG | BODY MASS INDEX: 20.58 KG/M2 | DIASTOLIC BLOOD PRESSURE: 67 MMHG | HEART RATE: 74 BPM | HEIGHT: 61 IN | RESPIRATION RATE: 14 BRPM

## 2023-08-01 PROCEDURE — 99214 OFFICE O/P EST MOD 30 MIN: CPT

## 2023-08-01 NOTE — PHYSICAL EXAM
[FreeTextEntry1] : NAD A&Ox3 Non-obese No significant change in today's examination ROM L-spine: 1/2 full forward flexion before pain; <5' extension before pain ROM Hips: R hip 20' IR w/ pain (static); 50' ER w/o pain; L hip 10-15'' IR and 30' ER w/ pain   Pelvic tilt: ++ left  Seated slump test: neg left SLR: neg left YULIA's: + bilaterally - difficultly positioning 2' pain w/ ER DTR's: 2+ knees; 1+ right ankle; absent left ankle (chronic) MMT: 4/5 B/L TA (chronic) Sensation: SILT Toe & Heel Walk: deferred Inspection Knees: no bony abnormality Effusion: none ROM: left knee 0'-145' flexion JLT: none Patella: + patella grind left Stability: stable V/V stresses Gurdeep's: neg

## 2023-08-01 NOTE — HISTORY OF PRESENT ILLNESS
[FreeTextEntry1] : 88 y.o. F w/ h/o clinical hip spine syndrome characterized by advanced lumbar DDD/spondylosis, L4-5 spondy/ stenosis, L L4/5 radiculopathy, left > right hip and knee OA returns to office for f/u.  Pt. has been taking meloxicam 7.5 mg po qod at night which is somewhat helpful.  The left hip CSI and left knee HA injections have since worn off.  Her right hip and knee are becoming more bothersome.  Exercise tolerance is limited.  Uses quad cane for short distances and RW for long distances.

## 2023-08-01 NOTE — ASSESSMENT
[FreeTextEntry1] : 88 y.o. 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  I spent most of today's visit (25 min) discussing etiology, pathogenesis and further non-operative vs. operative management.   It appears that the ultrasound-guided left hip corticosteroid injection and the ultrasound-guided left knee hyaluronic acid injections have worn off.  I explained that repeat corticosteroid injections risk osteonecrosis and fracture.  The patient does not wish to proceed with PRP injections as they are still considered experimental/ investigational and are not covered by most commercial insurances.  Of note, the patient's last CBC showed a platelet count 215 and an H/H of 12.2/37.5.  Regarding her lower back pain, I see no role for a transforaminal epidural steroid injection at L4-L5 given her lack of radicular pain complaints.  We again discussed the risks, benefits and alternatives to low-dose meloxicam 7.5 mg; 1 tab p.o. every other day as tolerated.  The patient previously deferred a prescription for physical therapy.  Pt. is in agreement with plan.  All questions answered.  RTC 3 months.

## 2023-08-02 ENCOUNTER — APPOINTMENT (OUTPATIENT)
Dept: INTERNAL MEDICINE | Facility: CLINIC | Age: 88
End: 2023-08-02
Payer: MEDICARE

## 2023-08-02 VITALS
OXYGEN SATURATION: 96 % | TEMPERATURE: 97.3 F | WEIGHT: 110 LBS | HEART RATE: 81 BPM | BODY MASS INDEX: 20.77 KG/M2 | RESPIRATION RATE: 14 BRPM | DIASTOLIC BLOOD PRESSURE: 58 MMHG | HEIGHT: 61 IN | SYSTOLIC BLOOD PRESSURE: 130 MMHG

## 2023-08-02 PROCEDURE — 99214 OFFICE O/P EST MOD 30 MIN: CPT | Mod: 25

## 2023-08-02 PROCEDURE — 36415 COLL VENOUS BLD VENIPUNCTURE: CPT

## 2023-08-02 NOTE — ASSESSMENT
[FreeTextEntry1] : -PMH: HTN, HLD, CKD, Osteopenia, Pre-DM, Lumbar & Cervical Spinal stenosis -SH: . 4 children. Lives with her  in an apartment (2nd floor). Denies any falls. Still driving.   WILLIS is a 88 year F whom is here today for f/u HTN, HLD, CKD, Depression, Weight loss  Specialists: -PM&R: Dr. Usman Statno  -F/u labs drawn in office today -Further recs pending lab results -RTO 2mo -RTO 4mo for CPE or sooner if needed.

## 2023-08-02 NOTE — HEALTH RISK ASSESSMENT
[2] : 2) Feeling down, depressed, or hopeless for more than half of the days (2) [PHQ-9 Positive] : PHQ-9 Positive [[id number]] : 4 [Never] : Never

## 2023-08-03 LAB
25(OH)D3 SERPL-MCNC: 65.9 NG/ML
ALBUMIN SERPL ELPH-MCNC: 4.5 G/DL
ALP BLD-CCNC: 110 U/L
ALT SERPL-CCNC: 11 U/L
ANION GAP SERPL CALC-SCNC: 12 MMOL/L
AST SERPL-CCNC: 19 U/L
BILIRUB SERPL-MCNC: 0.7 MG/DL
BUN SERPL-MCNC: 24 MG/DL
CALCIUM SERPL-MCNC: 10.4 MG/DL
CHLORIDE SERPL-SCNC: 102 MMOL/L
CO2 SERPL-SCNC: 28 MMOL/L
CREAT SERPL-MCNC: 0.95 MG/DL
EGFR: 58 ML/MIN/1.73M2
FOLATE SERPL-MCNC: 10.8 NG/ML
GLUCOSE SERPL-MCNC: 160 MG/DL
POTASSIUM SERPL-SCNC: 5 MMOL/L
PROT SERPL-MCNC: 7.1 G/DL
SODIUM SERPL-SCNC: 143 MMOL/L
TSH SERPL-ACNC: 2.88 UIU/ML
VIT B12 SERPL-MCNC: 472 PG/ML

## 2023-08-13 ENCOUNTER — RX RENEWAL (OUTPATIENT)
Age: 88
End: 2023-08-13

## 2023-10-16 ENCOUNTER — APPOINTMENT (OUTPATIENT)
Dept: PHYSICAL MEDICINE AND REHAB | Facility: CLINIC | Age: 88
End: 2023-10-16
Payer: MEDICARE

## 2023-10-16 VITALS
RESPIRATION RATE: 14 BRPM | HEIGHT: 61 IN | WEIGHT: 110 LBS | SYSTOLIC BLOOD PRESSURE: 120 MMHG | BODY MASS INDEX: 20.77 KG/M2 | DIASTOLIC BLOOD PRESSURE: 84 MMHG | HEART RATE: 73 BPM

## 2023-10-16 DIAGNOSIS — M25.562 PAIN IN LEFT KNEE: ICD-10-CM

## 2023-10-16 DIAGNOSIS — M25.552 PAIN IN LEFT HIP: ICD-10-CM

## 2023-10-16 DIAGNOSIS — G89.29 PAIN IN LEFT KNEE: ICD-10-CM

## 2023-10-16 DIAGNOSIS — M47.816 SPONDYLOSIS W/OUT MYELOPATHY OR RADICULOPATHY, LUMBAR REGION: ICD-10-CM

## 2023-10-16 PROCEDURE — 99214 OFFICE O/P EST MOD 30 MIN: CPT

## 2023-10-30 ENCOUNTER — NON-APPOINTMENT (OUTPATIENT)
Age: 88
End: 2023-10-30

## 2023-11-02 ENCOUNTER — RX RENEWAL (OUTPATIENT)
Age: 88
End: 2023-11-02

## 2023-11-06 ENCOUNTER — APPOINTMENT (OUTPATIENT)
Dept: INTERNAL MEDICINE | Facility: CLINIC | Age: 88
End: 2023-11-06

## 2023-11-26 ENCOUNTER — RX RENEWAL (OUTPATIENT)
Age: 88
End: 2023-11-26

## 2023-12-19 ENCOUNTER — RX RENEWAL (OUTPATIENT)
Age: 88
End: 2023-12-19

## 2023-12-19 ENCOUNTER — APPOINTMENT (OUTPATIENT)
Dept: INTERNAL MEDICINE | Facility: CLINIC | Age: 88
End: 2023-12-19
Payer: MEDICARE

## 2023-12-19 VITALS
HEIGHT: 61 IN | WEIGHT: 110 LBS | SYSTOLIC BLOOD PRESSURE: 140 MMHG | OXYGEN SATURATION: 97 % | TEMPERATURE: 97.2 F | RESPIRATION RATE: 14 BRPM | HEART RATE: 73 BPM | DIASTOLIC BLOOD PRESSURE: 54 MMHG | BODY MASS INDEX: 20.77 KG/M2

## 2023-12-19 DIAGNOSIS — M17.0 BILATERAL PRIMARY OSTEOARTHRITIS OF KNEE: ICD-10-CM

## 2023-12-19 PROCEDURE — 99214 OFFICE O/P EST MOD 30 MIN: CPT | Mod: 25

## 2023-12-19 PROCEDURE — 36415 COLL VENOUS BLD VENIPUNCTURE: CPT

## 2023-12-19 RX ORDER — GASTROSTOMY TUBE 18 FR
KIT MISCELLANEOUS
Qty: 3 | Refills: 3 | Status: DISCONTINUED | COMMUNITY
Start: 2023-01-30 | End: 2023-12-19

## 2023-12-19 RX ORDER — GABAPENTIN 100 MG/1
100 CAPSULE ORAL
Qty: 60 | Refills: 2 | Status: DISCONTINUED | COMMUNITY
Start: 2023-10-25 | End: 2023-12-19

## 2023-12-19 RX ORDER — DICLOFENAC POTASSIUM 50 MG/1
50 TABLET, COATED ORAL TWICE DAILY
Refills: 0 | Status: ACTIVE | COMMUNITY
Start: 2023-12-19

## 2023-12-19 RX ORDER — MELOXICAM 7.5 MG/1
7.5 TABLET ORAL
Qty: 30 | Refills: 3 | Status: DISCONTINUED | COMMUNITY
Start: 2023-03-22 | End: 2023-12-19

## 2023-12-19 RX ORDER — OXYCODONE 5 MG/1
5 TABLET ORAL
Qty: 30 | Refills: 0 | Status: DISCONTINUED | COMMUNITY
Start: 2023-10-16 | End: 2023-12-19

## 2023-12-19 NOTE — HEALTH RISK ASSESSMENT
[2] : 2) Feeling down, depressed, or hopeless for more than half of the days (2) [PHQ-9 Positive] : PHQ-9 Positive [Never] : Never [[id number]] : 4

## 2023-12-19 NOTE — HISTORY OF PRESENT ILLNESS
[FreeTextEntry1] : HTN,, Depression, Weight loss, gait instbaility, fatigue [de-identified] : -PMH: HTN, HLD, CKD, Osteopenia, Pre-DM, Lumbar & Cervical Spinal stenosis -SH: . 4 children. Lives alone. Denies any falls. Still driving.   WILLIS is a 88 year F whom is here today for f/u HTN, Depression, Weight loss, gait instability, fatigue reports feeling down and depressed following the loss of her  7mo ago Remains on Remeron   -HTN: Remains on Amlodipine 10mg & Benzapril 40mg QD. No reported changes. -HLD: Remains on Pravastatin 40mg. No reported changes.  -Osteopenia: (6/2021) DEXA: Normal. Remains on Vit-D -B/l Hip OA & Spinal Stenosis: No improvement w/ PT. Remains on Gabapentin per Pain mgt

## 2023-12-21 LAB
ANION GAP SERPL CALC-SCNC: 13 MMOL/L
BUN SERPL-MCNC: 29 MG/DL
CALCIUM SERPL-MCNC: 10.1 MG/DL
CHLORIDE SERPL-SCNC: 100 MMOL/L
CO2 SERPL-SCNC: 27 MMOL/L
CREAT SERPL-MCNC: 1.17 MG/DL
EGFR: 45 ML/MIN/1.73M2
GLUCOSE SERPL-MCNC: 117 MG/DL
POTASSIUM SERPL-SCNC: 4.8 MMOL/L
SODIUM SERPL-SCNC: 140 MMOL/L

## 2023-12-22 ENCOUNTER — RX RENEWAL (OUTPATIENT)
Age: 88
End: 2023-12-22

## 2023-12-26 ENCOUNTER — NON-APPOINTMENT (OUTPATIENT)
Age: 88
End: 2023-12-26

## 2024-03-19 ENCOUNTER — APPOINTMENT (OUTPATIENT)
Dept: INTERNAL MEDICINE | Facility: CLINIC | Age: 89
End: 2024-03-19
Payer: MEDICARE

## 2024-03-19 VITALS
TEMPERATURE: 97.3 F | HEIGHT: 61 IN | DIASTOLIC BLOOD PRESSURE: 64 MMHG | BODY MASS INDEX: 21.14 KG/M2 | SYSTOLIC BLOOD PRESSURE: 140 MMHG | RESPIRATION RATE: 14 BRPM | WEIGHT: 112 LBS | OXYGEN SATURATION: 97 % | HEART RATE: 71 BPM

## 2024-03-19 DIAGNOSIS — R26.81 UNSTEADINESS ON FEET: ICD-10-CM

## 2024-03-19 DIAGNOSIS — F32.A DEPRESSION, UNSPECIFIED: ICD-10-CM

## 2024-03-19 DIAGNOSIS — M85.80 OTHER SPECIFIED DISORDERS OF BONE DENSITY AND STRUCTURE, UNSPECIFIED SITE: ICD-10-CM

## 2024-03-19 DIAGNOSIS — R63.4 ABNORMAL WEIGHT LOSS: ICD-10-CM

## 2024-03-19 DIAGNOSIS — E78.5 HYPERLIPIDEMIA, UNSPECIFIED: ICD-10-CM

## 2024-03-19 DIAGNOSIS — N18.30 CHRONIC KIDNEY DISEASE, STAGE 3 UNSPECIFIED: ICD-10-CM

## 2024-03-19 DIAGNOSIS — I10 ESSENTIAL (PRIMARY) HYPERTENSION: ICD-10-CM

## 2024-03-19 PROCEDURE — 99214 OFFICE O/P EST MOD 30 MIN: CPT

## 2024-03-19 PROCEDURE — 36415 COLL VENOUS BLD VENIPUNCTURE: CPT

## 2024-03-19 RX ORDER — MIRTAZAPINE 7.5 MG/1
7.5 TABLET, FILM COATED ORAL
Qty: 90 | Refills: 0 | Status: ACTIVE | COMMUNITY
Start: 2023-03-06

## 2024-03-19 NOTE — HEALTH RISK ASSESSMENT
[Never] : Never [0] : 2) Feeling down, depressed, or hopeless: Not at all (0) [PHQ-2 Negative - No further assessment needed] : PHQ-2 Negative - No further assessment needed [[id number]] : 0

## 2024-03-19 NOTE — ASSESSMENT
[FreeTextEntry1] :  -F/u labs drawn in office today -Further recs pending lab results -F.u DEXA -RTO 4mo for CPE or sooner if needed

## 2024-03-19 NOTE — HISTORY OF PRESENT ILLNESS
[FreeTextEntry1] : HTN,, Depression, Weight loss, gait instbaility, fatigue [de-identified] : -PMH: HTN, HLD, CKD, Osteopenia, Pre-DM, Lumbar & Cervical Spinal stenosis -SH: . 4 children. Lives alone. Denies any falls. Still driving.   WILLIS is a 89 year F whom is here today for f/u HTN, Depression, Weight loss, gait instability, fatigue  -HTN: Remains on Amlodipine 10mg & Benzapril 40mg QD. -HLD: Remains on Pravastatin 40mg.  -Osteopenia: (6/2021) DEXA: Normal. Remains on Vit-D -B/l Hip OA & Spinal Stenosis: No improvement w/ PT. Remains on Gabapentin per Pain mgt

## 2024-03-20 LAB
25(OH)D3 SERPL-MCNC: 62.3 NG/ML
ALBUMIN SERPL ELPH-MCNC: 4.6 G/DL
ALP BLD-CCNC: 113 U/L
ALT SERPL-CCNC: 17 U/L
ANION GAP SERPL CALC-SCNC: 13 MMOL/L
AST SERPL-CCNC: 22 U/L
BASOPHILS # BLD AUTO: 0.06 K/UL
BASOPHILS NFR BLD AUTO: 1.2 %
BILIRUB SERPL-MCNC: 0.8 MG/DL
BUN SERPL-MCNC: 34 MG/DL
CALCIUM SERPL-MCNC: 10.2 MG/DL
CHLORIDE SERPL-SCNC: 102 MMOL/L
CHOLEST SERPL-MCNC: 220 MG/DL
CO2 SERPL-SCNC: 26 MMOL/L
CREAT SERPL-MCNC: 1.03 MG/DL
EGFR: 52 ML/MIN/1.73M2
EOSINOPHIL # BLD AUTO: 0.15 K/UL
EOSINOPHIL NFR BLD AUTO: 3.1 %
GLUCOSE SERPL-MCNC: 101 MG/DL
HCT VFR BLD CALC: 36.8 %
HDLC SERPL-MCNC: 77 MG/DL
HGB BLD-MCNC: 11.7 G/DL
IMM GRANULOCYTES NFR BLD AUTO: 0.4 %
LDLC SERPL CALC-MCNC: 123 MG/DL
LYMPHOCYTES # BLD AUTO: 1.12 K/UL
LYMPHOCYTES NFR BLD AUTO: 23 %
MAN DIFF?: NORMAL
MCHC RBC-ENTMCNC: 31.3 PG
MCHC RBC-ENTMCNC: 31.8 GM/DL
MCV RBC AUTO: 98.4 FL
MONOCYTES # BLD AUTO: 0.49 K/UL
MONOCYTES NFR BLD AUTO: 10.1 %
NEUTROPHILS # BLD AUTO: 3.03 K/UL
NEUTROPHILS NFR BLD AUTO: 62.2 %
NONHDLC SERPL-MCNC: 143 MG/DL
PLATELET # BLD AUTO: 206 K/UL
POTASSIUM SERPL-SCNC: 5.2 MMOL/L
PROT SERPL-MCNC: 6.9 G/DL
RBC # BLD: 3.74 M/UL
RBC # FLD: 13.2 %
SODIUM SERPL-SCNC: 140 MMOL/L
TRIGL SERPL-MCNC: 119 MG/DL
WBC # FLD AUTO: 4.87 K/UL

## 2024-04-17 ENCOUNTER — APPOINTMENT (OUTPATIENT)
Dept: RADIOLOGY | Facility: CLINIC | Age: 89
End: 2024-04-17
Payer: MEDICARE

## 2024-04-17 ENCOUNTER — OUTPATIENT (OUTPATIENT)
Dept: OUTPATIENT SERVICES | Facility: HOSPITAL | Age: 89
LOS: 1 days | End: 2024-04-17
Payer: MEDICARE

## 2024-04-17 DIAGNOSIS — Z00.8 ENCOUNTER FOR OTHER GENERAL EXAMINATION: ICD-10-CM

## 2024-04-17 DIAGNOSIS — M85.80 OTHER SPECIFIED DISORDERS OF BONE DENSITY AND STRUCTURE, UNSPECIFIED SITE: ICD-10-CM

## 2024-04-17 PROCEDURE — 77080 DXA BONE DENSITY AXIAL: CPT | Mod: 26

## 2024-04-17 PROCEDURE — 77080 DXA BONE DENSITY AXIAL: CPT

## 2024-05-16 ENCOUNTER — RX RENEWAL (OUTPATIENT)
Age: 89
End: 2024-05-16

## 2024-06-10 ENCOUNTER — RX RENEWAL (OUTPATIENT)
Age: 89
End: 2024-06-10

## 2024-06-11 ENCOUNTER — OUTPATIENT (OUTPATIENT)
Dept: OUTPATIENT SERVICES | Facility: HOSPITAL | Age: 89
LOS: 1 days | End: 2024-06-11
Payer: MEDICARE

## 2024-06-11 ENCOUNTER — RESULT REVIEW (OUTPATIENT)
Age: 89
End: 2024-06-11

## 2024-06-11 ENCOUNTER — APPOINTMENT (OUTPATIENT)
Dept: MAMMOGRAPHY | Facility: CLINIC | Age: 89
End: 2024-06-11
Payer: MEDICARE

## 2024-06-11 DIAGNOSIS — Z00.00 ENCOUNTER FOR GENERAL ADULT MEDICAL EXAMINATION WITHOUT ABNORMAL FINDINGS: ICD-10-CM

## 2024-06-11 DIAGNOSIS — Z00.8 ENCOUNTER FOR OTHER GENERAL EXAMINATION: ICD-10-CM

## 2024-06-11 PROCEDURE — 77063 BREAST TOMOSYNTHESIS BI: CPT

## 2024-06-11 PROCEDURE — 77063 BREAST TOMOSYNTHESIS BI: CPT | Mod: 26

## 2024-06-11 PROCEDURE — 77067 SCR MAMMO BI INCL CAD: CPT

## 2024-06-11 PROCEDURE — 77067 SCR MAMMO BI INCL CAD: CPT | Mod: 26

## 2024-10-11 ENCOUNTER — RX RENEWAL (OUTPATIENT)
Age: 89
End: 2024-10-11

## 2024-12-02 ENCOUNTER — RX RENEWAL (OUTPATIENT)
Age: 88
End: 2024-12-02

## 2024-12-02 ENCOUNTER — APPOINTMENT (OUTPATIENT)
Dept: INTERNAL MEDICINE | Facility: CLINIC | Age: 88
End: 2024-12-02
Payer: MEDICARE

## 2024-12-02 VITALS
TEMPERATURE: 97.3 F | OXYGEN SATURATION: 97 % | DIASTOLIC BLOOD PRESSURE: 58 MMHG | RESPIRATION RATE: 14 BRPM | WEIGHT: 107 LBS | HEIGHT: 61 IN | BODY MASS INDEX: 20.2 KG/M2 | SYSTOLIC BLOOD PRESSURE: 148 MMHG | HEART RATE: 81 BPM

## 2024-12-02 DIAGNOSIS — R63.4 ABNORMAL WEIGHT LOSS: ICD-10-CM

## 2024-12-02 DIAGNOSIS — R26.81 UNSTEADINESS ON FEET: ICD-10-CM

## 2024-12-02 DIAGNOSIS — I10 ESSENTIAL (PRIMARY) HYPERTENSION: ICD-10-CM

## 2024-12-02 DIAGNOSIS — E87.5 HYPERKALEMIA: ICD-10-CM

## 2024-12-02 DIAGNOSIS — F32.A DEPRESSION, UNSPECIFIED: ICD-10-CM

## 2024-12-02 DIAGNOSIS — N18.30 CHRONIC KIDNEY DISEASE, STAGE 3 UNSPECIFIED: ICD-10-CM

## 2024-12-02 DIAGNOSIS — E78.5 HYPERLIPIDEMIA, UNSPECIFIED: ICD-10-CM

## 2024-12-02 PROCEDURE — 99214 OFFICE O/P EST MOD 30 MIN: CPT

## 2024-12-02 PROCEDURE — 36415 COLL VENOUS BLD VENIPUNCTURE: CPT

## 2024-12-02 PROCEDURE — G2211 COMPLEX E/M VISIT ADD ON: CPT

## 2024-12-03 PROBLEM — E87.5 HYPERKALEMIA: Status: ACTIVE | Noted: 2024-12-03

## 2024-12-03 LAB
25(OH)D3 SERPL-MCNC: 63.5 NG/ML
ALBUMIN SERPL ELPH-MCNC: 4.2 G/DL
ALP BLD-CCNC: 114 U/L
ALT SERPL-CCNC: 12 U/L
ANION GAP SERPL CALC-SCNC: 16 MMOL/L
AST SERPL-CCNC: 20 U/L
BASOPHILS # BLD AUTO: 0.06 K/UL
BASOPHILS NFR BLD AUTO: 1 %
BILIRUB SERPL-MCNC: 0.8 MG/DL
BUN SERPL-MCNC: 34 MG/DL
CALCIUM SERPL-MCNC: 10.5 MG/DL
CHLORIDE SERPL-SCNC: 103 MMOL/L
CHOLEST SERPL-MCNC: 205 MG/DL
CO2 SERPL-SCNC: 25 MMOL/L
CREAT SERPL-MCNC: 1.1 MG/DL
EGFR: 48 ML/MIN/1.73M2
EOSINOPHIL # BLD AUTO: 0.19 K/UL
EOSINOPHIL NFR BLD AUTO: 3.2 %
FERRITIN SERPL-MCNC: 485 NG/ML
FOLATE SERPL-MCNC: 9 NG/ML
GLUCOSE SERPL-MCNC: 102 MG/DL
HCT VFR BLD CALC: 35.8 %
HDLC SERPL-MCNC: 80 MG/DL
HGB BLD-MCNC: 11.6 G/DL
IMM GRANULOCYTES NFR BLD AUTO: 0.3 %
IRON SATN MFR SERPL: 36 %
IRON SERPL-MCNC: 119 UG/DL
LDLC SERPL CALC-MCNC: 105 MG/DL
LYMPHOCYTES # BLD AUTO: 1.26 K/UL
LYMPHOCYTES NFR BLD AUTO: 21.1 %
MAN DIFF?: NORMAL
MCHC RBC-ENTMCNC: 32.4 G/DL
MCHC RBC-ENTMCNC: 32.7 PG
MCV RBC AUTO: 100.8 FL
MONOCYTES # BLD AUTO: 0.64 K/UL
MONOCYTES NFR BLD AUTO: 10.7 %
NEUTROPHILS # BLD AUTO: 3.81 K/UL
NEUTROPHILS NFR BLD AUTO: 63.7 %
NONHDLC SERPL-MCNC: 125 MG/DL
PLATELET # BLD AUTO: 208 K/UL
POTASSIUM SERPL-SCNC: 5.4 MMOL/L
PROT SERPL-MCNC: 6.6 G/DL
RBC # BLD: 3.55 M/UL
RBC # FLD: 12.6 %
SODIUM SERPL-SCNC: 143 MMOL/L
TIBC SERPL-MCNC: 330 UG/DL
TRIGL SERPL-MCNC: 114 MG/DL
TSH SERPL-ACNC: 2.52 UIU/ML
UIBC SERPL-MCNC: 211 UG/DL
VIT B12 SERPL-MCNC: 458 PG/ML
WBC # FLD AUTO: 5.98 K/UL

## 2024-12-17 ENCOUNTER — APPOINTMENT (OUTPATIENT)
Dept: INTERNAL MEDICINE | Facility: CLINIC | Age: 88
End: 2024-12-17
Payer: MEDICARE

## 2024-12-17 ENCOUNTER — LABORATORY RESULT (OUTPATIENT)
Age: 88
End: 2024-12-17

## 2024-12-17 VITALS
HEART RATE: 86 BPM | DIASTOLIC BLOOD PRESSURE: 50 MMHG | TEMPERATURE: 97.4 F | RESPIRATION RATE: 14 BRPM | BODY MASS INDEX: 20.2 KG/M2 | SYSTOLIC BLOOD PRESSURE: 130 MMHG | OXYGEN SATURATION: 98 % | HEIGHT: 61 IN | WEIGHT: 107 LBS

## 2024-12-17 VITALS — SYSTOLIC BLOOD PRESSURE: 125 MMHG | DIASTOLIC BLOOD PRESSURE: 70 MMHG

## 2024-12-17 DIAGNOSIS — E87.5 HYPERKALEMIA: ICD-10-CM

## 2024-12-17 DIAGNOSIS — I10 ESSENTIAL (PRIMARY) HYPERTENSION: ICD-10-CM

## 2024-12-17 DIAGNOSIS — R63.4 ABNORMAL WEIGHT LOSS: ICD-10-CM

## 2024-12-17 DIAGNOSIS — R79.89 OTHER SPECIFIED ABNORMAL FINDINGS OF BLOOD CHEMISTRY: ICD-10-CM

## 2024-12-17 PROCEDURE — 36415 COLL VENOUS BLD VENIPUNCTURE: CPT

## 2024-12-17 PROCEDURE — 99214 OFFICE O/P EST MOD 30 MIN: CPT

## 2024-12-18 LAB
ALBUMIN SERPL ELPH-MCNC: 4.3 G/DL
ALP BLD-CCNC: 119 U/L
ALT SERPL-CCNC: 12 U/L
ANION GAP SERPL CALC-SCNC: 13 MMOL/L
AST SERPL-CCNC: 17 U/L
BILIRUB SERPL-MCNC: 0.7 MG/DL
BUN SERPL-MCNC: 31 MG/DL
CALCIUM SERPL-MCNC: 10 MG/DL
CHLORIDE SERPL-SCNC: 101 MMOL/L
CO2 SERPL-SCNC: 26 MMOL/L
CREAT SERPL-MCNC: 1.2 MG/DL
EGFR: 43 ML/MIN/1.73M2
GLUCOSE SERPL-MCNC: 190 MG/DL
POTASSIUM SERPL-SCNC: 4.8 MMOL/L
PROT SERPL-MCNC: 6.4 G/DL
SODIUM SERPL-SCNC: 140 MMOL/L

## 2024-12-20 LAB
BASOPHILS # BLD AUTO: 0.06 K/UL
BASOPHILS NFR BLD AUTO: 1 %
EOSINOPHIL # BLD AUTO: 0.21 K/UL
EOSINOPHIL NFR BLD AUTO: 3.5 %
FERRITIN SERPL-MCNC: 493 NG/ML
HCT VFR BLD CALC: 36.2 %
HGB BLD-MCNC: 11.6 G/DL
IMM GRANULOCYTES NFR BLD AUTO: 0.5 %
LYMPHOCYTES # BLD AUTO: 1.1 K/UL
LYMPHOCYTES NFR BLD AUTO: 18.2 %
MAN DIFF?: NORMAL
MCHC RBC-ENTMCNC: 32 G/DL
MCHC RBC-ENTMCNC: 32.4 PG
MCV RBC AUTO: 101.1 FL
MONOCYTES # BLD AUTO: 0.54 K/UL
MONOCYTES NFR BLD AUTO: 8.9 %
NEUTROPHILS # BLD AUTO: 4.12 K/UL
NEUTROPHILS NFR BLD AUTO: 67.9 %
PLATELET # BLD AUTO: 235 K/UL
RBC # BLD: 3.58 M/UL
RBC # FLD: 12.9 %
WBC # FLD AUTO: 6.06 K/UL

## 2024-12-23 ENCOUNTER — RX RENEWAL (OUTPATIENT)
Age: 88
End: 2024-12-23

## 2024-12-26 ENCOUNTER — RX RENEWAL (OUTPATIENT)
Age: 88
End: 2024-12-26

## 2025-03-06 ENCOUNTER — RX RENEWAL (OUTPATIENT)
Age: 89
End: 2025-03-06

## 2025-06-16 ENCOUNTER — APPOINTMENT (OUTPATIENT)
Dept: INTERNAL MEDICINE | Facility: CLINIC | Age: 89
End: 2025-06-16
Payer: MEDICARE

## 2025-06-16 VITALS
OXYGEN SATURATION: 96 % | BODY MASS INDEX: 19.63 KG/M2 | TEMPERATURE: 97.7 F | HEART RATE: 82 BPM | WEIGHT: 104 LBS | DIASTOLIC BLOOD PRESSURE: 54 MMHG | RESPIRATION RATE: 14 BRPM | HEIGHT: 61 IN | SYSTOLIC BLOOD PRESSURE: 150 MMHG

## 2025-06-16 VITALS — SYSTOLIC BLOOD PRESSURE: 145 MMHG | DIASTOLIC BLOOD PRESSURE: 62 MMHG

## 2025-06-16 PROBLEM — Z86.39 HISTORY OF HYPERKALEMIA: Status: RESOLVED | Noted: 2024-12-03 | Resolved: 2025-06-16

## 2025-06-16 PROBLEM — R73.03 PRE-DIABETES: Status: RESOLVED | Noted: 2020-01-21 | Resolved: 2025-06-16

## 2025-06-16 PROBLEM — Z87.2 HISTORY OF NUMMULAR ECZEMA: Status: RESOLVED | Noted: 2021-08-18 | Resolved: 2025-06-16

## 2025-06-16 PROBLEM — R22.1 LUMP IN NECK: Status: RESOLVED | Noted: 2021-06-08 | Resolved: 2025-06-16

## 2025-06-16 PROBLEM — M25.532 ARTHRALGIA OF LEFT WRIST: Status: ACTIVE | Noted: 2025-06-16

## 2025-06-16 PROCEDURE — 93000 ELECTROCARDIOGRAM COMPLETE: CPT

## 2025-06-16 PROCEDURE — 36415 COLL VENOUS BLD VENIPUNCTURE: CPT

## 2025-06-16 PROCEDURE — G0439: CPT

## 2025-06-17 LAB
25(OH)D3 SERPL-MCNC: 64.8 NG/ML
CRP SERPL-MCNC: <3 MG/L
ERYTHROCYTE [SEDIMENTATION RATE] IN BLOOD BY WESTERGREN METHOD: 9 MM/HR
TSH SERPL-ACNC: 2.59 UIU/ML

## 2025-06-19 PROBLEM — R74.8 ELEVATED ALKALINE PHOSPHATASE LEVEL: Status: ACTIVE | Noted: 2025-06-19

## 2025-06-19 LAB
ALBUMIN SERPL ELPH-MCNC: 4.5 G/DL
ALP BLD-CCNC: 128 U/L
ALT SERPL-CCNC: 15 U/L
ANION GAP SERPL CALC-SCNC: 15 MMOL/L
AST SERPL-CCNC: 19 U/L
BASOPHILS # BLD AUTO: 0.04 K/UL
BASOPHILS NFR BLD AUTO: 0.7 %
BILIRUB SERPL-MCNC: 1 MG/DL
BUN SERPL-MCNC: 34 MG/DL
CALCIUM SERPL-MCNC: 10.1 MG/DL
CHLORIDE SERPL-SCNC: 102 MMOL/L
CHOLEST SERPL-MCNC: 194 MG/DL
CO2 SERPL-SCNC: 24 MMOL/L
CREAT SERPL-MCNC: 1.22 MG/DL
EGFRCR SERPLBLD CKD-EPI 2021: 42 ML/MIN/1.73M2
EOSINOPHIL # BLD AUTO: 0.19 K/UL
EOSINOPHIL NFR BLD AUTO: 3.5 %
FERRITIN SERPL-MCNC: 483 NG/ML
FOLATE SERPL-MCNC: 7.6 NG/ML
GLUCOSE SERPL-MCNC: 109 MG/DL
HCT VFR BLD CALC: 36.7 %
HDLC SERPL-MCNC: 74 MG/DL
HGB BLD-MCNC: 11.7 G/DL
IMM GRANULOCYTES NFR BLD AUTO: 0.2 %
IRON SATN MFR SERPL: 31 %
IRON SERPL-MCNC: 103 UG/DL
LDLC SERPL-MCNC: 98 MG/DL
LYMPHOCYTES # BLD AUTO: 1.28 K/UL
LYMPHOCYTES NFR BLD AUTO: 23.6 %
MAN DIFF?: NORMAL
MCHC RBC-ENTMCNC: 31.9 G/DL
MCHC RBC-ENTMCNC: 32 PG
MCV RBC AUTO: 100.3 FL
MONOCYTES # BLD AUTO: 0.56 K/UL
MONOCYTES NFR BLD AUTO: 10.3 %
NEUTROPHILS # BLD AUTO: 3.34 K/UL
NEUTROPHILS NFR BLD AUTO: 61.7 %
NONHDLC SERPL-MCNC: 120 MG/DL
PLATELET # BLD AUTO: 215 K/UL
POTASSIUM SERPL-SCNC: 4.9 MMOL/L
PROT SERPL-MCNC: 6.8 G/DL
RBC # BLD: 3.66 M/UL
RBC # FLD: 13.4 %
SODIUM SERPL-SCNC: 141 MMOL/L
TIBC SERPL-MCNC: 335 UG/DL
TRIGL SERPL-MCNC: 129 MG/DL
UIBC SERPL-MCNC: 232 UG/DL
VIT B12 SERPL-MCNC: 468 PG/ML
WBC # FLD AUTO: 5.42 K/UL

## 2025-07-11 ENCOUNTER — RESULT REVIEW (OUTPATIENT)
Age: 89
End: 2025-07-11

## 2025-07-11 ENCOUNTER — APPOINTMENT (OUTPATIENT)
Dept: MAMMOGRAPHY | Facility: CLINIC | Age: 89
End: 2025-07-11
Payer: MEDICARE

## 2025-07-11 ENCOUNTER — OUTPATIENT (OUTPATIENT)
Dept: OUTPATIENT SERVICES | Facility: HOSPITAL | Age: 89
LOS: 1 days | End: 2025-07-11
Payer: MEDICARE

## 2025-07-11 ENCOUNTER — APPOINTMENT (OUTPATIENT)
Dept: RADIOLOGY | Facility: CLINIC | Age: 89
End: 2025-07-11
Payer: MEDICARE

## 2025-07-11 DIAGNOSIS — Z00.00 ENCOUNTER FOR GENERAL ADULT MEDICAL EXAMINATION WITHOUT ABNORMAL FINDINGS: ICD-10-CM

## 2025-07-11 PROCEDURE — 77063 BREAST TOMOSYNTHESIS BI: CPT | Mod: 26

## 2025-07-11 PROCEDURE — 77067 SCR MAMMO BI INCL CAD: CPT | Mod: 26

## 2025-07-11 PROCEDURE — 77063 BREAST TOMOSYNTHESIS BI: CPT

## 2025-07-11 PROCEDURE — 73110 X-RAY EXAM OF WRIST: CPT | Mod: 26,LT

## 2025-07-11 PROCEDURE — 73110 X-RAY EXAM OF WRIST: CPT

## 2025-07-11 PROCEDURE — 77067 SCR MAMMO BI INCL CAD: CPT

## 2025-07-15 ENCOUNTER — RX RENEWAL (OUTPATIENT)
Age: 89
End: 2025-07-15

## 2025-07-21 DIAGNOSIS — M11.20 OTHER CHONDROCALCINOSIS, UNSPECIFIED SITE: ICD-10-CM

## 2025-08-11 ENCOUNTER — RX RENEWAL (OUTPATIENT)
Age: 89
End: 2025-08-11